# Patient Record
Sex: MALE | Race: BLACK OR AFRICAN AMERICAN | NOT HISPANIC OR LATINO | Employment: OTHER | URBAN - METROPOLITAN AREA
[De-identification: names, ages, dates, MRNs, and addresses within clinical notes are randomized per-mention and may not be internally consistent; named-entity substitution may affect disease eponyms.]

---

## 2018-08-17 ENCOUNTER — HOSPITAL ENCOUNTER (INPATIENT)
Facility: HOSPITAL | Age: 78
LOS: 4 days | Discharge: NURSING, REHAB OR LONG-TERM CARE | DRG: 299 | End: 2018-08-21
Attending: FAMILY MEDICINE | Admitting: FAMILY MEDICINE
Payer: MEDICARE

## 2018-08-17 DIAGNOSIS — I87.8 VENOUS STASIS OF BOTH LOWER EXTREMITIES: Primary | ICD-10-CM

## 2018-08-17 DIAGNOSIS — I87.2 VENOUS STASIS ULCER OF LEFT CALF LIMITED TO BREAKDOWN OF SKIN WITHOUT VARICOSE VEINS: ICD-10-CM

## 2018-08-17 DIAGNOSIS — I50.23 SYSTOLIC CHF, ACUTE ON CHRONIC: ICD-10-CM

## 2018-08-17 DIAGNOSIS — I50.9 CHF (CONGESTIVE HEART FAILURE): ICD-10-CM

## 2018-08-17 DIAGNOSIS — R60.0 BILATERAL LOWER EXTREMITY EDEMA: ICD-10-CM

## 2018-08-17 DIAGNOSIS — L97.221 VENOUS STASIS ULCER OF LEFT CALF LIMITED TO BREAKDOWN OF SKIN WITHOUT VARICOSE VEINS: ICD-10-CM

## 2018-08-17 DIAGNOSIS — I50.30 (HFPEF) HEART FAILURE WITH PRESERVED EJECTION FRACTION: ICD-10-CM

## 2018-08-17 LAB
ALBUMIN SERPL BCP-MCNC: 3.1 G/DL
ALP SERPL-CCNC: 69 U/L
ALT SERPL W/O P-5'-P-CCNC: 11 U/L
ANION GAP SERPL CALC-SCNC: 14 MMOL/L
AST SERPL-CCNC: 33 U/L
BACTERIA #/AREA URNS HPF: ABNORMAL /HPF
BASOPHILS # BLD AUTO: 0.03 K/UL
BASOPHILS NFR BLD: 0.4 %
BILIRUB SERPL-MCNC: 1.4 MG/DL
BILIRUB UR QL STRIP: NEGATIVE
BNP SERPL-MCNC: 193 PG/ML
BUN SERPL-MCNC: 10 MG/DL
CALCIUM SERPL-MCNC: 9.6 MG/DL
CHLORIDE SERPL-SCNC: 89 MMOL/L
CK SERPL-CCNC: 906 U/L
CK SERPL-CCNC: 906 U/L
CLARITY UR: CLEAR
CO2 SERPL-SCNC: 37 MMOL/L
COLOR UR: YELLOW
CREAT SERPL-MCNC: 1 MG/DL
DIFFERENTIAL METHOD: ABNORMAL
EOSINOPHIL # BLD AUTO: 0 K/UL
EOSINOPHIL NFR BLD: 0.5 %
ERYTHROCYTE [DISTWIDTH] IN BLOOD BY AUTOMATED COUNT: 14.6 %
EST. GFR  (AFRICAN AMERICAN): >60 ML/MIN/1.73 M^2
EST. GFR  (NON AFRICAN AMERICAN): >60 ML/MIN/1.73 M^2
GLUCOSE SERPL-MCNC: 146 MG/DL
GLUCOSE UR QL STRIP: NEGATIVE
HCT VFR BLD AUTO: 38.1 %
HGB BLD-MCNC: 12.8 G/DL
HGB UR QL STRIP: ABNORMAL
KETONES UR QL STRIP: NEGATIVE
LEUKOCYTE ESTERASE UR QL STRIP: ABNORMAL
LYMPHOCYTES # BLD AUTO: 1.4 K/UL
LYMPHOCYTES NFR BLD: 16.5 %
MAGNESIUM SERPL-MCNC: 1.9 MG/DL
MCH RBC QN AUTO: 28.1 PG
MCHC RBC AUTO-ENTMCNC: 33.6 G/DL
MCV RBC AUTO: 84 FL
MICROSCOPIC COMMENT: ABNORMAL
MONOCYTES # BLD AUTO: 0.7 K/UL
MONOCYTES NFR BLD: 8.4 %
NEUTROPHILS # BLD AUTO: 6.2 K/UL
NEUTROPHILS NFR BLD: 74.2 %
NITRITE UR QL STRIP: NEGATIVE
PH UR STRIP: 7 [PH] (ref 5–8)
PLATELET # BLD AUTO: 272 K/UL
PMV BLD AUTO: 8.3 FL
POTASSIUM SERPL-SCNC: 2.5 MMOL/L
PROT SERPL-MCNC: 8 G/DL
PROT UR QL STRIP: NEGATIVE
RBC # BLD AUTO: 4.56 M/UL
RBC #/AREA URNS HPF: 5 /HPF (ref 0–4)
SODIUM SERPL-SCNC: 140 MMOL/L
SP GR UR STRIP: 1.01 (ref 1–1.03)
TROPONIN I SERPL DL<=0.01 NG/ML-MCNC: 0.02 NG/ML
URN SPEC COLLECT METH UR: ABNORMAL
UROBILINOGEN UR STRIP-ACNC: >=8 EU/DL
WBC # BLD AUTO: 8.3 K/UL
WBC #/AREA URNS HPF: 5 /HPF (ref 0–5)

## 2018-08-17 PROCEDURE — 99285 EMERGENCY DEPT VISIT HI MDM: CPT | Mod: 25

## 2018-08-17 PROCEDURE — 96365 THER/PROPH/DIAG IV INF INIT: CPT

## 2018-08-17 PROCEDURE — 63600175 PHARM REV CODE 636 W HCPCS: Performed by: FAMILY MEDICINE

## 2018-08-17 PROCEDURE — 93010 ELECTROCARDIOGRAM REPORT: CPT | Mod: ,,, | Performed by: INTERNAL MEDICINE

## 2018-08-17 PROCEDURE — 85025 COMPLETE CBC W/AUTO DIFF WBC: CPT

## 2018-08-17 PROCEDURE — 96375 TX/PRO/DX INJ NEW DRUG ADDON: CPT

## 2018-08-17 PROCEDURE — 80053 COMPREHEN METABOLIC PANEL: CPT

## 2018-08-17 PROCEDURE — 93005 ELECTROCARDIOGRAM TRACING: CPT

## 2018-08-17 PROCEDURE — 25000003 PHARM REV CODE 250: Performed by: FAMILY MEDICINE

## 2018-08-17 PROCEDURE — 81000 URINALYSIS NONAUTO W/SCOPE: CPT

## 2018-08-17 PROCEDURE — 83735 ASSAY OF MAGNESIUM: CPT

## 2018-08-17 PROCEDURE — 83880 ASSAY OF NATRIURETIC PEPTIDE: CPT

## 2018-08-17 PROCEDURE — 82550 ASSAY OF CK (CPK): CPT

## 2018-08-17 PROCEDURE — 21400001 HC TELEMETRY ROOM

## 2018-08-17 PROCEDURE — 84484 ASSAY OF TROPONIN QUANT: CPT

## 2018-08-17 RX ORDER — POTASSIUM CHLORIDE 20 MEQ/15ML
20 SOLUTION ORAL
Status: COMPLETED | OUTPATIENT
Start: 2018-08-17 | End: 2018-08-17

## 2018-08-17 RX ORDER — KETOROLAC TROMETHAMINE 30 MG/ML
30 INJECTION, SOLUTION INTRAMUSCULAR; INTRAVENOUS
Status: COMPLETED | OUTPATIENT
Start: 2018-08-17 | End: 2018-08-17

## 2018-08-17 RX ORDER — FUROSEMIDE 10 MG/ML
40 INJECTION INTRAMUSCULAR; INTRAVENOUS
Status: COMPLETED | OUTPATIENT
Start: 2018-08-17 | End: 2018-08-17

## 2018-08-17 RX ORDER — POTASSIUM CHLORIDE 20 MEQ/15ML
20 SOLUTION ORAL ONCE
Status: COMPLETED | OUTPATIENT
Start: 2018-08-18 | End: 2018-08-18

## 2018-08-17 RX ADMIN — POTASSIUM CHLORIDE 20 MEQ: 20 SOLUTION ORAL at 11:08

## 2018-08-17 RX ADMIN — FUROSEMIDE 40 MG: 10 INJECTION, SOLUTION INTRAMUSCULAR; INTRAVENOUS at 11:08

## 2018-08-17 RX ADMIN — CEFTRIAXONE SODIUM 2 G: 2 INJECTION, POWDER, FOR SOLUTION INTRAMUSCULAR; INTRAVENOUS at 11:08

## 2018-08-17 RX ADMIN — KETOROLAC TROMETHAMINE 30 MG: 30 INJECTION, SOLUTION INTRAMUSCULAR at 11:08

## 2018-08-18 PROBLEM — L97.211 VENOUS STASIS ULCER OF RIGHT CALF LIMITED TO BREAKDOWN OF SKIN WITHOUT VARICOSE VEINS: Status: ACTIVE | Noted: 2018-08-18

## 2018-08-18 PROBLEM — I87.2 VENOUS STASIS ULCER OF RIGHT CALF LIMITED TO BREAKDOWN OF SKIN WITHOUT VARICOSE VEINS: Status: ACTIVE | Noted: 2018-08-18

## 2018-08-18 PROBLEM — E87.6 HYPOKALEMIA: Status: ACTIVE | Noted: 2018-08-18

## 2018-08-18 PROBLEM — I87.8 VENOUS STASIS OF BOTH LOWER EXTREMITIES: Status: ACTIVE | Noted: 2018-08-18

## 2018-08-18 PROBLEM — M62.82 NON-TRAUMATIC RHABDOMYOLYSIS: Status: ACTIVE | Noted: 2018-08-18

## 2018-08-18 LAB
ANION GAP SERPL CALC-SCNC: 11 MMOL/L
ANION GAP SERPL CALC-SCNC: 11 MMOL/L
BASOPHILS # BLD AUTO: 0.04 K/UL
BASOPHILS NFR BLD: 0.5 %
BUN SERPL-MCNC: 10 MG/DL
BUN SERPL-MCNC: 10 MG/DL
CALCIUM SERPL-MCNC: 9.2 MG/DL
CALCIUM SERPL-MCNC: 9.4 MG/DL
CHLORIDE SERPL-SCNC: 90 MMOL/L
CHLORIDE SERPL-SCNC: 91 MMOL/L
CO2 SERPL-SCNC: 38 MMOL/L
CO2 SERPL-SCNC: 38 MMOL/L
CREAT SERPL-MCNC: 0.9 MG/DL
CREAT SERPL-MCNC: 1 MG/DL
DIFFERENTIAL METHOD: ABNORMAL
EOSINOPHIL # BLD AUTO: 0 K/UL
EOSINOPHIL NFR BLD: 0.3 %
ERYTHROCYTE [DISTWIDTH] IN BLOOD BY AUTOMATED COUNT: 14.7 %
EST. GFR  (AFRICAN AMERICAN): >60 ML/MIN/1.73 M^2
EST. GFR  (AFRICAN AMERICAN): >60 ML/MIN/1.73 M^2
EST. GFR  (NON AFRICAN AMERICAN): >60 ML/MIN/1.73 M^2
EST. GFR  (NON AFRICAN AMERICAN): >60 ML/MIN/1.73 M^2
ESTIMATED AVG GLUCOSE: 123 MG/DL
GLUCOSE SERPL-MCNC: 124 MG/DL
GLUCOSE SERPL-MCNC: 126 MG/DL
HBA1C MFR BLD HPLC: 5.9 %
HCT VFR BLD AUTO: 38.8 %
HGB BLD-MCNC: 12.8 G/DL
INR PPP: 1.9
LYMPHOCYTES # BLD AUTO: 1.1 K/UL
LYMPHOCYTES NFR BLD: 14.1 %
MAGNESIUM SERPL-MCNC: 1.8 MG/DL
MCH RBC QN AUTO: 27.8 PG
MCHC RBC AUTO-ENTMCNC: 33 G/DL
MCV RBC AUTO: 84 FL
MONOCYTES # BLD AUTO: 0.9 K/UL
MONOCYTES NFR BLD: 11.8 %
NEUTROPHILS # BLD AUTO: 5.7 K/UL
NEUTROPHILS NFR BLD: 73.3 %
PHOSPHATE SERPL-MCNC: 2.5 MG/DL
PLATELET # BLD AUTO: 266 K/UL
PMV BLD AUTO: 8.5 FL
POCT GLUCOSE: 112 MG/DL (ref 70–110)
POCT GLUCOSE: 122 MG/DL (ref 70–110)
POCT GLUCOSE: 135 MG/DL (ref 70–110)
POTASSIUM SERPL-SCNC: 2.6 MMOL/L
POTASSIUM SERPL-SCNC: 3 MMOL/L
PROTHROMBIN TIME: 19.5 SEC
RBC # BLD AUTO: 4.61 M/UL
SODIUM SERPL-SCNC: 139 MMOL/L
SODIUM SERPL-SCNC: 140 MMOL/L
URATE SERPL-MCNC: 7.1 MG/DL
WBC # BLD AUTO: 7.79 K/UL

## 2018-08-18 PROCEDURE — 84550 ASSAY OF BLOOD/URIC ACID: CPT

## 2018-08-18 PROCEDURE — C8929 TTE W OR WO FOL WCON,DOPPLER: HCPCS

## 2018-08-18 PROCEDURE — 83735 ASSAY OF MAGNESIUM: CPT

## 2018-08-18 PROCEDURE — 63600175 PHARM REV CODE 636 W HCPCS: Performed by: FAMILY MEDICINE

## 2018-08-18 PROCEDURE — 63600175 PHARM REV CODE 636 W HCPCS: Performed by: INTERNAL MEDICINE

## 2018-08-18 PROCEDURE — 25000003 PHARM REV CODE 250: Performed by: INTERNAL MEDICINE

## 2018-08-18 PROCEDURE — 99900037 HC PT THERAPY SCREENING (STAT)

## 2018-08-18 PROCEDURE — 83036 HEMOGLOBIN GLYCOSYLATED A1C: CPT

## 2018-08-18 PROCEDURE — 90715 TDAP VACCINE 7 YRS/> IM: CPT | Performed by: INTERNAL MEDICINE

## 2018-08-18 PROCEDURE — 63600175 PHARM REV CODE 636 W HCPCS: Mod: JG | Performed by: INTERNAL MEDICINE

## 2018-08-18 PROCEDURE — 90471 IMMUNIZATION ADMIN: CPT | Performed by: INTERNAL MEDICINE

## 2018-08-18 PROCEDURE — 21400001 HC TELEMETRY ROOM

## 2018-08-18 PROCEDURE — 85025 COMPLETE CBC W/AUTO DIFF WBC: CPT

## 2018-08-18 PROCEDURE — 93306 TTE W/DOPPLER COMPLETE: CPT | Mod: 26,,, | Performed by: INTERNAL MEDICINE

## 2018-08-18 PROCEDURE — 80048 BASIC METABOLIC PNL TOTAL CA: CPT

## 2018-08-18 PROCEDURE — 84100 ASSAY OF PHOSPHORUS: CPT

## 2018-08-18 PROCEDURE — G0378 HOSPITAL OBSERVATION PER HR: HCPCS

## 2018-08-18 PROCEDURE — 96376 TX/PRO/DX INJ SAME DRUG ADON: CPT

## 2018-08-18 PROCEDURE — 96366 THER/PROPH/DIAG IV INF ADDON: CPT

## 2018-08-18 PROCEDURE — 25000003 PHARM REV CODE 250: Performed by: FAMILY MEDICINE

## 2018-08-18 PROCEDURE — 96367 TX/PROPH/DG ADDL SEQ IV INF: CPT

## 2018-08-18 PROCEDURE — 85610 PROTHROMBIN TIME: CPT

## 2018-08-18 PROCEDURE — 80048 BASIC METABOLIC PNL TOTAL CA: CPT | Mod: 91

## 2018-08-18 RX ORDER — ENOXAPARIN SODIUM 100 MG/ML
40 INJECTION SUBCUTANEOUS EVERY 24 HOURS
Status: DISCONTINUED | OUTPATIENT
Start: 2018-08-18 | End: 2018-08-21 | Stop reason: HOSPADM

## 2018-08-18 RX ORDER — FUROSEMIDE 10 MG/ML
40 INJECTION INTRAMUSCULAR; INTRAVENOUS
Status: COMPLETED | OUTPATIENT
Start: 2018-08-18 | End: 2018-08-18

## 2018-08-18 RX ORDER — POTASSIUM CHLORIDE 20 MEQ/15ML
40 SOLUTION ORAL
Status: DISCONTINUED | OUTPATIENT
Start: 2018-08-18 | End: 2018-08-20

## 2018-08-18 RX ORDER — SODIUM,POTASSIUM PHOSPHATES 280-250MG
2 POWDER IN PACKET (EA) ORAL
Status: DISCONTINUED | OUTPATIENT
Start: 2018-08-18 | End: 2018-08-21 | Stop reason: HOSPADM

## 2018-08-18 RX ORDER — SODIUM CHLORIDE 0.9 % (FLUSH) 0.9 %
5 SYRINGE (ML) INJECTION
Status: DISCONTINUED | OUTPATIENT
Start: 2018-08-18 | End: 2018-08-21 | Stop reason: HOSPADM

## 2018-08-18 RX ORDER — GLUCAGON 1 MG
1 KIT INJECTION
Status: DISCONTINUED | OUTPATIENT
Start: 2018-08-18 | End: 2018-08-21 | Stop reason: HOSPADM

## 2018-08-18 RX ORDER — POTASSIUM CHLORIDE 20 MEQ/15ML
40 SOLUTION ORAL ONCE
Status: DISCONTINUED | OUTPATIENT
Start: 2018-08-18 | End: 2018-08-18

## 2018-08-18 RX ORDER — TRAMADOL HYDROCHLORIDE 50 MG/1
50 TABLET ORAL EVERY 8 HOURS PRN
COMMUNITY

## 2018-08-18 RX ORDER — LANOLIN ALCOHOL/MO/W.PET/CERES
800 CREAM (GRAM) TOPICAL
Status: DISCONTINUED | OUTPATIENT
Start: 2018-08-18 | End: 2018-08-21 | Stop reason: HOSPADM

## 2018-08-18 RX ORDER — ATORVASTATIN CALCIUM 10 MG/1
10 TABLET, FILM COATED ORAL DAILY
COMMUNITY

## 2018-08-18 RX ORDER — POTASSIUM CHLORIDE 7.45 MG/ML
20 INJECTION INTRAVENOUS
Status: COMPLETED | OUTPATIENT
Start: 2018-08-18 | End: 2018-08-18

## 2018-08-18 RX ORDER — WARFARIN SODIUM 5 MG/1
5 TABLET ORAL DAILY
COMMUNITY

## 2018-08-18 RX ORDER — IBUPROFEN 200 MG
24 TABLET ORAL
Status: DISCONTINUED | OUTPATIENT
Start: 2018-08-18 | End: 2018-08-21 | Stop reason: HOSPADM

## 2018-08-18 RX ORDER — IBUPROFEN 200 MG
16 TABLET ORAL
Status: DISCONTINUED | OUTPATIENT
Start: 2018-08-18 | End: 2018-08-21 | Stop reason: HOSPADM

## 2018-08-18 RX ORDER — POLYETHYLENE GLYCOL 3350 17 G/17G
17 POWDER, FOR SOLUTION ORAL DAILY
Status: DISCONTINUED | OUTPATIENT
Start: 2018-08-18 | End: 2018-08-21 | Stop reason: HOSPADM

## 2018-08-18 RX ORDER — POTASSIUM CHLORIDE 20 MEQ/1
40 TABLET, EXTENDED RELEASE ORAL ONCE
Status: DISCONTINUED | OUTPATIENT
Start: 2018-08-18 | End: 2018-08-21 | Stop reason: ALTCHOICE

## 2018-08-18 RX ORDER — ACETAMINOPHEN 325 MG/1
650 TABLET ORAL EVERY 6 HOURS PRN
Status: DISCONTINUED | OUTPATIENT
Start: 2018-08-18 | End: 2018-08-21 | Stop reason: HOSPADM

## 2018-08-18 RX ORDER — FUROSEMIDE 10 MG/ML
40 INJECTION INTRAMUSCULAR; INTRAVENOUS DAILY
Status: DISCONTINUED | OUTPATIENT
Start: 2018-08-18 | End: 2018-08-20

## 2018-08-18 RX ORDER — LISINOPRIL 5 MG/1
5 TABLET ORAL DAILY
COMMUNITY

## 2018-08-18 RX ORDER — KETOROLAC TROMETHAMINE 30 MG/ML
15 INJECTION, SOLUTION INTRAMUSCULAR; INTRAVENOUS EVERY 6 HOURS PRN
Status: ACTIVE | OUTPATIENT
Start: 2018-08-18 | End: 2018-08-18

## 2018-08-18 RX ORDER — INSULIN ASPART 100 [IU]/ML
0-5 INJECTION, SOLUTION INTRAVENOUS; SUBCUTANEOUS
Status: DISCONTINUED | OUTPATIENT
Start: 2018-08-18 | End: 2018-08-21 | Stop reason: HOSPADM

## 2018-08-18 RX ORDER — POTASSIUM CHLORIDE 20 MEQ/15ML
40 SOLUTION ORAL
Status: COMPLETED | OUTPATIENT
Start: 2018-08-18 | End: 2018-08-18

## 2018-08-18 RX ORDER — KETOROLAC TROMETHAMINE 30 MG/ML
30 INJECTION, SOLUTION INTRAMUSCULAR; INTRAVENOUS
Status: COMPLETED | OUTPATIENT
Start: 2018-08-18 | End: 2018-08-18

## 2018-08-18 RX ORDER — DIGOXIN 250 MCG
250 TABLET ORAL DAILY
COMMUNITY

## 2018-08-18 RX ORDER — IBUPROFEN 400 MG/1
400 TABLET ORAL EVERY 6 HOURS PRN
Status: DISCONTINUED | OUTPATIENT
Start: 2018-08-18 | End: 2018-08-20

## 2018-08-18 RX ORDER — SULFAMETHOXAZOLE AND TRIMETHOPRIM 400; 80 MG/1; MG/1
1 TABLET ORAL 2 TIMES DAILY
Status: ON HOLD | COMMUNITY
End: 2018-08-21 | Stop reason: HOSPADM

## 2018-08-18 RX ORDER — FUROSEMIDE 40 MG/1
40 TABLET ORAL 2 TIMES DAILY
COMMUNITY

## 2018-08-18 RX ORDER — HYDROCODONE BITARTRATE AND ACETAMINOPHEN 10; 325 MG/1; MG/1
1 TABLET ORAL EVERY 6 HOURS PRN
Status: DISCONTINUED | OUTPATIENT
Start: 2018-08-18 | End: 2018-08-21 | Stop reason: HOSPADM

## 2018-08-18 RX ORDER — ONDANSETRON 8 MG/1
8 TABLET, ORALLY DISINTEGRATING ORAL EVERY 8 HOURS PRN
Status: DISCONTINUED | OUTPATIENT
Start: 2018-08-18 | End: 2018-08-21 | Stop reason: HOSPADM

## 2018-08-18 RX ADMIN — CEFTAROLINE FOSAMIL 600 MG: 600 POWDER, FOR SOLUTION INTRAVENOUS at 10:08

## 2018-08-18 RX ADMIN — ENOXAPARIN SODIUM 40 MG: 100 INJECTION SUBCUTANEOUS at 04:08

## 2018-08-18 RX ADMIN — POTASSIUM CHLORIDE 20 MEQ: 7.46 INJECTION, SOLUTION INTRAVENOUS at 03:08

## 2018-08-18 RX ADMIN — POLYETHYLENE GLYCOL 3350 17 G: 17 POWDER, FOR SOLUTION ORAL at 09:08

## 2018-08-18 RX ADMIN — POTASSIUM CHLORIDE 40 MEQ: 20 SOLUTION ORAL at 03:08

## 2018-08-18 RX ADMIN — POTASSIUM & SODIUM PHOSPHATES POWDER PACK 280-160-250 MG 2 PACKET: 280-160-250 PACK at 08:08

## 2018-08-18 RX ADMIN — FUROSEMIDE 40 MG: 10 INJECTION, SOLUTION INTRAMUSCULAR; INTRAVENOUS at 03:08

## 2018-08-18 RX ADMIN — POTASSIUM CHLORIDE 40 MEQ: 20 SOLUTION ORAL at 07:08

## 2018-08-18 RX ADMIN — HYDROCODONE BITARTRATE AND ACETAMINOPHEN 1 TABLET: 10; 325 TABLET ORAL at 08:08

## 2018-08-18 RX ADMIN — POTASSIUM CHLORIDE 20 MEQ: 20 SOLUTION ORAL at 01:08

## 2018-08-18 RX ADMIN — BACITRACIN, NEOMYCIN, POLYMYXIN B: 400; 3.5; 5 OINTMENT TOPICAL at 09:08

## 2018-08-18 RX ADMIN — CLOSTRIDIUM TETANI TOXOID ANTIGEN (FORMALDEHYDE INACTIVATED), CORYNEBACTERIUM DIPHTHERIAE TOXOID ANTIGEN (FORMALDEHYDE INACTIVATED), BORDETELLA PERTUSSIS TOXOID ANTIGEN (GLUTARALDEHYDE INACTIVATED), BORDETELLA PERTUSSIS FILAMENTOUS HEMAGGLUTININ ANTIGEN (FORMALDEHYDE INACTIVATED), BORDETELLA PERTUSSIS PERTACTIN ANTIGEN, AND BORDETELLA PERTUSSIS FIMBRIAE 2/3 ANTIGEN 0.5 ML: 5; 2; 2.5; 5; 3; 5 INJECTION, SUSPENSION INTRAMUSCULAR at 03:08

## 2018-08-18 RX ADMIN — FUROSEMIDE 40 MG: 10 INJECTION, SOLUTION INTRAMUSCULAR; INTRAVENOUS at 12:08

## 2018-08-18 RX ADMIN — BACITRACIN, NEOMYCIN, POLYMYXIN B: 400; 3.5; 5 OINTMENT TOPICAL at 08:08

## 2018-08-18 RX ADMIN — KETOROLAC TROMETHAMINE 30 MG: 30 INJECTION, SOLUTION INTRAMUSCULAR at 03:08

## 2018-08-18 NOTE — PLAN OF CARE
Problem: Patient Care Overview  Goal: Plan of Care Review  Outcome: Ongoing (interventions implemented as appropriate)  Patient remains free from falls or injury this shift, safety measures in place. Medications administered per order, patient tolerating treatment. VS stable, normal sinus rhythm on the telemetry monitor. Bed alarm in use. Call light and belongings within reach. Denies any other needs or complaints. Will continue to monitor.

## 2018-08-18 NOTE — PT/OT/SLP PROGRESS
Physical Therapy      Patient Name:  Jessee Katz   MRN:  [id number]    PT CHART REVIEW COMPLETED. PT WITH 10/10 LEG PAIN AND REQUEST NO EVAL AT THIS TIME. P.T. EDUCATED PT ON ROLE OF P.T. AND TO RETURN NEXT VISIT TO COMPLETE EVAL.     Yuridia Vega, PT,8/18/2018

## 2018-08-18 NOTE — ED PROVIDER NOTES
SCRIBE #1 NOTE: I, Priya Briscoe, am scribing for, and in the presence of, Kalyn Yousif MD. I have scribed the entire note.      History      Chief Complaint   Patient presents with    Leg Swelling     bilateral leg swelling for several weeks       Review of patient's allergies indicates:  No Known Allergies     HPI   HPI    8/17/2018, 10:22 PM   History obtained from the patient      History of Present Illness: Jessee Katz is a 78 y.o. male patient who presents to the Emergency Department for BLE swelling which onset gradually a few weeks ago. Symptoms are constant and moderate in severity. No mitigating or exacerbating factors reported. No associated sxs included. Patient denies any fever, chills, CP, SOB, abd distention, scrotal swelling, dysuria, N/V, recent travel, long car rides, and all other sxs at this time. Patient reports living alone and caring for himself. No further complaints or concerns at this time.     Arrival mode: Personal vehicle     PCP: Олег Ribeiro MD     Past Medical History:  Past medical history reviewed not relevant      Past Surgical History:  Past surgical history reviewed not relevant      Family History:  Family history reviewed not relevant      Social History:  Social History    Social History Main Topics    Social History Main Topics    Smoking status: Unknown if ever smoked    Smokeless tobacco: Unknown if ever used    Alcohol Use: Unknown drinking history    Drug Use: Unknown if ever used    Sexual Activity: Unknown       ROS   Review of Systems   Constitutional: Negative for chills and fever.   HENT: Negative for sore throat.    Respiratory: Negative for shortness of breath.    Cardiovascular: Positive for leg swelling (BLE). Negative for chest pain.   Gastrointestinal: Negative for abdominal distention, nausea and vomiting.   Genitourinary: Negative for dysuria and scrotal swelling.   Musculoskeletal: Negative for back pain.   Skin: Negative for rash.    Neurological: Negative for weakness.   Hematological: Does not bruise/bleed easily.   All other systems reviewed and are negative.      Physical Exam      Initial Vitals [08/17/18 2108]   BP Pulse Resp Temp SpO2   (!) 152/74 100 18 99.4 °F (37.4 °C) 95 %      MAP       --          Physical Exam  Nursing Notes and Vital Signs Reviewed.  Constitutional: Patient is in no acute distress. Well-developed and well-nourished.  Head: Atraumatic. Normocephalic.  Eyes: PERRL. EOM intact. Conjunctivae are not pale. No scleral icterus.  ENT: Mucous membranes are moist. Oropharynx is clear and symmetric.    Neck: Supple. Full ROM. No lymphadenopathy.  Cardiovascular: Regular rate. Irregularly irregular rhythm. No murmurs, rubs, or gallops. Distal pulses are 2+ and symmetric.  Pulmonary/Chest: No respiratory distress. Clear to auscultation bilaterally. No wheezing or rales.  Abdominal: Soft and non-distended.  There is no tenderness.  No rebound, guarding, or rigidity. Good bowel sounds.  Genitourinary: No CVA tenderness  Musculoskeletal: Moves all extremities. No obvious deformities. No calf tenderness.  RLE, LLE: Extensive diffuse piting edema to BLE extending to knees. Venous stasis changes to BLE. 2cm round ulcer to L lateral ankle, no drainage, dry, no palpable fluctuance. Unable to range bilat knees and ankles secondary to pain. LLE sensitive to touch. Cap refill distally is <2 seconds. DP and PT pulses are equal and 2+ bilaterally.  Skin: Warm and dry.  Neurological:  Alert, awake, and appropriate.  Normal speech.  No acute focal neurological deficits are appreciated.  Psychiatric: Normal affect. Good eye contact. Appropriate in content.    ED Course    Procedures  ED Vital Signs:  Vitals:    08/17/18 2108 08/17/18 2317 08/17/18 2332 08/18/18 0136   BP: (!) 152/74 135/81  (!) 152/82   Pulse: 100 92 103 105   Resp: 18 17  (!) 31   Temp: 99.4 °F (37.4 °C)      TempSrc: Oral      SpO2: 95% 99%  99%   Weight: 126.1 kg (278  "lb)      Height: 5' 11" (1.803 m)          Abnormal Lab Results:  Labs Reviewed   CBC W/ AUTO DIFFERENTIAL - Abnormal; Notable for the following components:       Result Value    RBC 4.56 (*)     Hemoglobin 12.8 (*)     Hematocrit 38.1 (*)     RDW 14.6 (*)     MPV 8.3 (*)     Gran% 74.2 (*)     Lymph% 16.5 (*)     All other components within normal limits   COMPREHENSIVE METABOLIC PANEL - Abnormal; Notable for the following components:    Potassium 2.5 (*)     Chloride 89 (*)     CO2 37 (*)     Glucose 146 (*)     Albumin 3.1 (*)     Total Bilirubin 1.4 (*)     All other components within normal limits    Narrative:     K critical result(s) called and verbal readback obtained from   Suzanne Bae RN, 08/17/2018 23:30   CK - Abnormal; Notable for the following components:     (*)     All other components within normal limits   CK - Abnormal; Notable for the following components:     (*)     All other components within normal limits   B-TYPE NATRIURETIC PEPTIDE - Abnormal; Notable for the following components:     (*)     All other components within normal limits   URINALYSIS - Abnormal; Notable for the following components:    Occult Blood UA 3+ (*)     Urobilinogen, UA >=8.0 (*)     Leukocytes, UA 1+ (*)     All other components within normal limits   URINALYSIS MICROSCOPIC - Abnormal; Notable for the following components:    RBC, UA 5 (*)     Bacteria, UA Many (*)     All other components within normal limits   BASIC METABOLIC PANEL - Abnormal; Notable for the following components:    Potassium 2.6 (*)     Chloride 90 (*)     CO2 38 (*)     Glucose 126 (*)     All other components within normal limits    Narrative:     K critical result(s) called and verbal readback obtained from Elodia Murray RN, 08/18/2018 02:46   MAGNESIUM   TROPONIN I   BASIC METABOLIC PANEL        All Lab Results:  Results for orders placed or performed during the hospital encounter of 08/17/18   CBC auto differential "   Result Value Ref Range    WBC 8.30 3.90 - 12.70 K/uL    RBC 4.56 (L) 4.60 - 6.20 M/uL    Hemoglobin 12.8 (L) 14.0 - 18.0 g/dL    Hematocrit 38.1 (L) 40.0 - 54.0 %    MCV 84 82 - 98 fL    MCH 28.1 27.0 - 31.0 pg    MCHC 33.6 32.0 - 36.0 g/dL    RDW 14.6 (H) 11.5 - 14.5 %    Platelets 272 150 - 350 K/uL    MPV 8.3 (L) 9.2 - 12.9 fL    Gran # (ANC) 6.2 1.8 - 7.7 K/uL    Lymph # 1.4 1.0 - 4.8 K/uL    Mono # 0.7 0.3 - 1.0 K/uL    Eos # 0.0 0.0 - 0.5 K/uL    Baso # 0.03 0.00 - 0.20 K/uL    Gran% 74.2 (H) 38.0 - 73.0 %    Lymph% 16.5 (L) 18.0 - 48.0 %    Mono% 8.4 4.0 - 15.0 %    Eosinophil% 0.5 0.0 - 8.0 %    Basophil% 0.4 0.0 - 1.9 %    Differential Method Automated    Comprehensive metabolic panel   Result Value Ref Range    Sodium 140 136 - 145 mmol/L    Potassium 2.5 (LL) 3.5 - 5.1 mmol/L    Chloride 89 (L) 95 - 110 mmol/L    CO2 37 (H) 23 - 29 mmol/L    Glucose 146 (H) 70 - 110 mg/dL    BUN, Bld 10 8 - 23 mg/dL    Creatinine 1.0 0.5 - 1.4 mg/dL    Calcium 9.6 8.7 - 10.5 mg/dL    Total Protein 8.0 6.0 - 8.4 g/dL    Albumin 3.1 (L) 3.5 - 5.2 g/dL    Total Bilirubin 1.4 (H) 0.1 - 1.0 mg/dL    Alkaline Phosphatase 69 55 - 135 U/L    AST 33 10 - 40 U/L    ALT 11 10 - 44 U/L    Anion Gap 14 8 - 16 mmol/L    eGFR if African American >60 >60 mL/min/1.73 m^2    eGFR if non African American >60 >60 mL/min/1.73 m^2   Magnesium   Result Value Ref Range    Magnesium 1.9 1.6 - 2.6 mg/dL   Troponin I   Result Value Ref Range    Troponin I 0.016 0.000 - 0.026 ng/mL   CPK   Result Value Ref Range     (H) 20 - 200 U/L   CPK   Result Value Ref Range     (H) 20 - 200 U/L   Brain natriuretic peptide   Result Value Ref Range     (H) 0 - 99 pg/mL   Urinalysis   Result Value Ref Range    Specimen UA Urine, Clean Catch     Color, UA Yellow Yellow, Straw, Debora    Appearance, UA Clear Clear    pH, UA 7.0 5.0 - 8.0    Specific Gravity, UA 1.010 1.005 - 1.030    Protein, UA Negative Negative    Glucose, UA Negative  Negative    Ketones, UA Negative Negative    Bilirubin (UA) Negative Negative    Occult Blood UA 3+ (A) Negative    Nitrite, UA Negative Negative    Urobilinogen, UA >=8.0 (A) <2.0 EU/dL    Leukocytes, UA 1+ (A) Negative   Urinalysis Microscopic   Result Value Ref Range    RBC, UA 5 (H) 0 - 4 /hpf    WBC, UA 5 0 - 5 /hpf    Bacteria, UA Many (A) None-Occ /hpf    Microscopic Comment SEE COMMENT    Basic metabolic panel   Result Value Ref Range    Sodium 139 136 - 145 mmol/L    Potassium 2.6 (LL) 3.5 - 5.1 mmol/L    Chloride 90 (L) 95 - 110 mmol/L    CO2 38 (H) 23 - 29 mmol/L    Glucose 126 (H) 70 - 110 mg/dL    BUN, Bld 10 8 - 23 mg/dL    Creatinine 0.9 0.5 - 1.4 mg/dL    Calcium 9.2 8.7 - 10.5 mg/dL    Anion Gap 11 8 - 16 mmol/L    eGFR if African American >60 >60 mL/min/1.73 m^2    eGFR if non African American >60 >60 mL/min/1.73 m^2       Imaging Results:  Imaging Results          X-Ray Chest AP Portable (Final result)  Result time 08/17/18 22:59:28    Final result by Ventura Turcios MD (08/17/18 22:59:28)                 Impression:      No acute process seen.      Electronically signed by: Ventura Turcios MD  Date:    08/17/2018  Time:    22:59             Narrative:    EXAMINATION:  XR CHEST AP PORTABLE    CLINICAL HISTORY:  Chest Pain;    FINDINGS:  Single view of the chest.  Aorta demonstrates atherosclerotic disease.    Cardiac silhouette is normal.  The lungs demonstrate no evidence of active disease.  No evidence of pleural effusion or pneumothorax.  Bones demonstrate scattered degenerative changes.                               The EKG was ordered, reviewed, and independently interpreted by the ED provider.  Interpretation time: 2225  Rate: 105 BPM  Rhythm: atrial fibrillation with rapid ventricular response with premature ventricular or aberrantly conducted complexes  Interpretation: ST & T wave abnormality, consider anterolateral ischemia. Abnormal ECG. No STEMI.         The Emergency Provider reviewed  the vital signs and test results, which are outlined above.    ED Discussion     2:51 AM: Discussed case with Maryann English NP (Hospital Medicine) who agrees with current care and management of pt and accepts admission.   Admitting Service: Hospital medicine   Admitting Physician: Dr. Jonse  Admit to: Med-tele    2:58 AM: Re-evaluated pt. I have discussed test results, shared treatment plan, and the need for admission with patient and family at bedside. Pt and family express understanding at this time and agree with all information. All questions answered. Pt and family have no further questions or concerns at this time. Pt is ready for admit.      ED Medication(s):  Medications   potassium chloride 10 mEq in 100 mL IVPB (not administered)   furosemide injection 40 mg (40 mg Intravenous Given 8/17/18 2312)   ketorolac injection 30 mg (30 mg Intravenous Given 8/17/18 2312)   cefTRIAXone (ROCEPHIN) 2 g in dextrose 5 % 50 mL IVPB (0 g Intravenous Stopped 8/18/18 0012)   potassium chloride 10% oral solution 20 mEq (20 mEq Oral Given 8/18/18 0104)   potassium chloride 10% oral solution 20 mEq (20 mEq Oral Given 8/17/18 2338)       New Prescriptions    No medications on file             Medical Decision Making    Medical Decision Making:   Clinical Tests:   Lab Tests: Ordered and Reviewed  Radiological Study: Ordered and Reviewed  Medical Tests: Ordered and Reviewed           Scribe Attestation:   Scribe #1: I performed the above scribed service and the documentation accurately describes the services I performed. I attest to the accuracy of the note.    Attending:   Physician Attestation Statement for Scribe #1: I, Kalyn Yousif MD, personally performed the services described in this documentation, as scribed by Priya Briscoe, in my presence, and it is both accurate and complete.          Clinical Impression       ICD-10-CM ICD-9-CM   1. Venous stasis of both lower extremities I87.8 459.81   2. Bilateral lower extremity  edema R60.0 782.3       Disposition:   Disposition: Admitted (Med-tele)  Condition: Fair         Kalyn Yousif MD  08/18/18 1958

## 2018-08-18 NOTE — ED NOTES
Pt resting in ER stretcher, aaox4, rr e/u, NAD noted. Pt remains on cardiac monitor with vss noted. Bed low and locked, call light in reach, side rails up x2. Pt verbalized understanding of status and POC; denies further needs. Will continue to monitor.

## 2018-08-18 NOTE — H&P
[facility] Hospital Medicine  History & Physical    Patient Name: Jessee Katz  MRN: [id number]  Admission Date: 8/17/2018  Attending Physician: Joann Jones MD  Primary Care Provider: Олег Ribeiro MD         Patient information was obtained from patient and ER records.     Subjective:     Principal Problem:<principal problem not specified>    Chief Complaint:   Chief Complaint   Patient presents with    Leg Swelling     bilateral leg swelling for several weeks        HPI: The patient is a 78-year old man presenting with bilateral leg swelling and ulceration for about a month. He states he sees both his cardiologist as well as PCP and started having bilateral leg swellings about 8 months ago. He soaked it in bleach hoping it would go away. He had blood work done n the E indicating early rhabdomyolysis as well as sever hypokalemia.     No past medical history on file.    No past surgical history on file.    Review of patient's allergies indicates:  No Known Allergies    No current facility-administered medications on file prior to encounter.      No current outpatient medications on file prior to encounter.     Family History     None        Tobacco Use    Smoking status: Not on file   Substance and Sexual Activity    Alcohol use: Not on file    Drug use: Not on file    Sexual activity: Not on file     Review of Systems   Constitutional: Negative for activity change, appetite change, chills, diaphoresis, fever and unexpected weight change.   HENT: Negative for congestion, drooling, ear discharge, ear pain, postnasal drip, trouble swallowing and voice change.    Eyes: Negative for photophobia, pain, discharge, redness, itching and visual disturbance.   Respiratory: Negative for apnea, choking, chest tightness, shortness of breath and stridor.    Cardiovascular: Positive for leg swelling. Negative for chest pain and palpitations.   Gastrointestinal: Negative for abdominal distention,  abdominal pain, anal bleeding, blood in stool, nausea and vomiting.   Genitourinary: Negative for difficulty urinating, dysuria, flank pain, frequency, hematuria, penile swelling, scrotal swelling and urgency.   Musculoskeletal: Positive for arthralgias, gait problem and joint swelling. Negative for myalgias, neck pain and neck stiffness.   Skin: Positive for color change and wound.   Allergic/Immunologic: Negative for environmental allergies, food allergies and immunocompromised state.   Neurological: Negative for dizziness, seizures, syncope, facial asymmetry, speech difficulty, numbness and headaches.   Hematological: Negative for adenopathy. Does not bruise/bleed easily.   Psychiatric/Behavioral: Negative for agitation, behavioral problems, confusion, decreased concentration, dysphoric mood, hallucinations and self-injury. The patient is not nervous/anxious and is not hyperactive.    All other systems reviewed and are negative.    Objective:     Vital Signs (Most Recent):  Temp: 99.7 °F (37.6 °C) (08/18/18 0342)  Pulse: 90 (08/18/18 0341)  Resp: (!) 23 (08/18/18 0341)  BP: (!) 156/86 (08/18/18 0341)  SpO2: 99 % (08/18/18 0136) Vital Signs (24h Range):  Temp:  [99.4 °F (37.4 °C)-99.7 °F (37.6 °C)] 99.7 °F (37.6 °C)  Pulse:  [] 90  Resp:  [17-31] 23  SpO2:  [95 %-99 %] 99 %  BP: (135-156)/(74-86) 156/86     Weight: 126.1 kg (278 lb)  Body mass index is 38.77 kg/m².    Physical Exam   Constitutional: He is oriented to person, place, and time. He appears well-developed and well-nourished. No distress.   HENT:   Head: Normocephalic and atraumatic.   Right Ear: External ear normal.   Left Ear: External ear normal.   Nose: Nose normal.   Mouth/Throat: Oropharyngeal exudate present.   Eyes: Conjunctivae and EOM are normal. Pupils are equal, round, and reactive to light. Right eye exhibits no discharge. Left eye exhibits no discharge. No scleral icterus.   Neck: Normal range of motion. Neck supple. No JVD present.  No tracheal deviation present. No thyromegaly present.   Cardiovascular: Normal rate, regular rhythm and normal heart sounds. Exam reveals no gallop and no friction rub.   No murmur heard.  Pulmonary/Chest: Effort normal and breath sounds normal. No stridor. No respiratory distress. He has no wheezes. He has no rales. He exhibits no tenderness.   Abdominal: Soft. Bowel sounds are normal. He exhibits no distension and no mass. There is no tenderness. There is no rebound and no guarding. No hernia.   Musculoskeletal: He exhibits edema, tenderness and deformity.   Left knee and right knee tender on ROM   Lymphadenopathy:     He has no cervical adenopathy.   Neurological: He is alert and oriented to person, place, and time. He displays normal reflexes. No cranial nerve deficit or sensory deficit. He exhibits normal muscle tone. Coordination normal.   Skin: Skin is warm. Capillary refill takes 2 to 3 seconds. He is not diaphoretic.   Early elephantiasis with hyperpigmentation. Two lateral right lower extremityx ulcers x 2. Icthyotic feet.   Dirty. Poor hygeine.    Psychiatric: He has a normal mood and affect. His behavior is normal. Judgment and thought content normal.   Nursing note and vitals reviewed.        CRANIAL NERVES     CN III, IV, VI   Pupils are equal, round, and reactive to light.  Extraocular motions are normal.        Significant Labs:   Recent Lab Results       08/18/18  0211 08/17/18  2346 08/17/18  2225      Albumin   3.1     Alkaline Phosphatase   69     ALT   11     Anion Gap 11  14     Appearance, UA  Clear      AST   33     Bacteria, UA  Many      Baso #   0.03     Basophil%   0.4     Bilirubin (UA)  Negative      Total Bilirubin   1.4  Comment:  For infants and newborns, interpretation of results should be based  on gestational age, weight and in agreement with clinical  observations.  Premature Infant recommended reference ranges:  Up to 24 hours.............<8.0 mg/dL  Up to 48  hours............<12.0 mg/dL  3-5 days..................<15.0 mg/dL  6-29 days.................<15.0 mg/dL       BNP   193  Comment:  Values of less than 100 pg/ml are consistent with non-CHF populations.     BUN, Bld 10  10     Calcium 9.2  9.6     Chloride 90  89     CO2 38  37     Color, UA  Yellow      CPK   906        906     Creatinine 0.9  1.0     Differential Method   Automated     eGFR if  >60  >60     eGFR if non  >60  Comment:  Calculation used to obtain the estimated glomerular filtration  rate (eGFR) is the CKD-EPI equation.     >60  Comment:  Calculation used to obtain the estimated glomerular filtration  rate (eGFR) is the CKD-EPI equation.        Eos #   0.0     Eosinophil%   0.5     Glucose 126  146     Glucose, UA  Negative      Gran # (ANC)   6.2     Gran%   74.2     Hematocrit   38.1     Hemoglobin   12.8     Ketones, UA  Negative      Leukocytes, UA  1+      Lymph #   1.4     Lymph%   16.5     Magnesium   1.9     MCH   28.1     MCHC   33.6     MCV   84     Microscopic Comment  SEE COMMENT  Comment:  Other formed elements not mentioned in the report are not   present in the microscopic examination.         Mono #   0.7     Mono%   8.4     MPV   8.3     Nitrite, UA  Negative      Occult Blood UA  3+      pH, UA  7.0      Platelets   272     Potassium 2.6  Comment:  K critical result(s) called and verbal readback obtained from Elodia Murray RN, 08/18/2018 02:46    2.5  Comment:  K critical result(s) called and verbal readback obtained from   Suzanne Bae RN, 08/17/2018 23:30       Total Protein   8.0     Protein, UA  Negative  Comment:  Recommend a 24 hour urine protein or a urine   protein/creatinine ratio if globulin induced proteinuria is  clinically suspected.        RBC   4.56     RBC, UA  5      RDW   14.6     Sodium 139  140     Specific Gravity, UA  1.010      Specimen UA  Urine, Clean Catch      Troponin I   0.016  Comment:  The reference interval for  Troponin I represents the 99th percentile   cutoff   for our facility and is consistent with 3rd generation assay   performance.       Urobilinogen, UA  >=8.0      WBC, UA  5      WBC   8.30           Significant Imaging:   Imaging Results          X-Ray Chest AP Portable (Final result)  Result time 08/17/18 22:59:28    Final result by Ventura Turcios MD (08/17/18 22:59:28)                 Impression:      No acute process seen.      Electronically signed by: Ventura Turcios MD  Date:    08/17/2018  Time:    22:59             Narrative:    EXAMINATION:  XR CHEST AP PORTABLE    CLINICAL HISTORY:  Chest Pain;    FINDINGS:  Single view of the chest.  Aorta demonstrates atherosclerotic disease.    Cardiac silhouette is normal.  The lungs demonstrate no evidence of active disease.  No evidence of pleural effusion or pneumothorax.  Bones demonstrate scattered degenerative changes.                                  Assessment/Plan:     Venous stasis ulcer of right calf limited to breakdown of skin without varicose veins    Local and systemic antibiotics. Wound care consult.           Hypokalemia    Supplement IV and PO and recheck K levels. Will also get magnesium level.      Total critical care time spent on the patient excluding any separaely billable procedures is 37 minutes to include, patient care, discussing with patient, writing orders, evaluating laboratory results.          Non-traumatic rhabdomyolysis    May be secondary to hypokalemia. Will however get phosphorus levels also.             VTE Risk Mitigation (From admission, onward)        Ordered     enoxaparin injection 40 mg  Daily      08/18/18 0347     IP VTE HIGH RISK PATIENT  Once      08/18/18 0347             Joann Jones MD  Department of Hospital Medicine   [facility]

## 2018-08-18 NOTE — SUBJECTIVE & OBJECTIVE
No past medical history on file.    No past surgical history on file.    Review of patient's allergies indicates:  No Known Allergies    No current facility-administered medications on file prior to encounter.      No current outpatient medications on file prior to encounter.     Family History     None        Tobacco Use    Smoking status: Not on file   Substance and Sexual Activity    Alcohol use: Not on file    Drug use: Not on file    Sexual activity: Not on file     Review of Systems   Constitutional: Negative for activity change, appetite change, chills, diaphoresis, fever and unexpected weight change.   HENT: Negative for congestion, drooling, ear discharge, ear pain, postnasal drip, trouble swallowing and voice change.    Eyes: Negative for photophobia, pain, discharge, redness, itching and visual disturbance.   Respiratory: Negative for apnea, choking, chest tightness, shortness of breath and stridor.    Cardiovascular: Positive for leg swelling. Negative for chest pain and palpitations.   Gastrointestinal: Negative for abdominal distention, abdominal pain, anal bleeding, blood in stool, nausea and vomiting.   Genitourinary: Negative for difficulty urinating, dysuria, flank pain, frequency, hematuria, penile swelling, scrotal swelling and urgency.   Musculoskeletal: Positive for arthralgias, gait problem and joint swelling. Negative for myalgias, neck pain and neck stiffness.   Skin: Positive for color change and wound.   Allergic/Immunologic: Negative for environmental allergies, food allergies and immunocompromised state.   Neurological: Negative for dizziness, seizures, syncope, facial asymmetry, speech difficulty, numbness and headaches.   Hematological: Negative for adenopathy. Does not bruise/bleed easily.   Psychiatric/Behavioral: Negative for agitation, behavioral problems, confusion, decreased concentration, dysphoric mood, hallucinations and self-injury. The patient is not nervous/anxious and  is not hyperactive.    All other systems reviewed and are negative.    Objective:     Vital Signs (Most Recent):  Temp: 99.7 °F (37.6 °C) (08/18/18 0342)  Pulse: 90 (08/18/18 0341)  Resp: (!) 23 (08/18/18 0341)  BP: (!) 156/86 (08/18/18 0341)  SpO2: 99 % (08/18/18 0136) Vital Signs (24h Range):  Temp:  [99.4 °F (37.4 °C)-99.7 °F (37.6 °C)] 99.7 °F (37.6 °C)  Pulse:  [] 90  Resp:  [17-31] 23  SpO2:  [95 %-99 %] 99 %  BP: (135-156)/(74-86) 156/86     Weight: 126.1 kg (278 lb)  Body mass index is 38.77 kg/m².    Physical Exam   Constitutional: He is oriented to person, place, and time. He appears well-developed and well-nourished. No distress.   HENT:   Head: Normocephalic and atraumatic.   Right Ear: External ear normal.   Left Ear: External ear normal.   Nose: Nose normal.   Mouth/Throat: Oropharyngeal exudate present.   Eyes: Conjunctivae and EOM are normal. Pupils are equal, round, and reactive to light. Right eye exhibits no discharge. Left eye exhibits no discharge. No scleral icterus.   Neck: Normal range of motion. Neck supple. No JVD present. No tracheal deviation present. No thyromegaly present.   Cardiovascular: Normal rate, regular rhythm and normal heart sounds. Exam reveals no gallop and no friction rub.   No murmur heard.  Pulmonary/Chest: Effort normal and breath sounds normal. No stridor. No respiratory distress. He has no wheezes. He has no rales. He exhibits no tenderness.   Abdominal: Soft. Bowel sounds are normal. He exhibits no distension and no mass. There is no tenderness. There is no rebound and no guarding. No hernia.   Musculoskeletal: He exhibits edema, tenderness and deformity.   Left knee and right knee tender on ROM   Lymphadenopathy:     He has no cervical adenopathy.   Neurological: He is alert and oriented to person, place, and time. He displays normal reflexes. No cranial nerve deficit or sensory deficit. He exhibits normal muscle tone. Coordination normal.   Skin: Skin is warm.  Capillary refill takes 2 to 3 seconds. He is not diaphoretic.   Early elephantiasis with hyperpigmentation. Two lateral right lower extremityx ulcers x 2. Icthyotic feet.   Dirty. Poor hygeine.    Psychiatric: He has a normal mood and affect. His behavior is normal. Judgment and thought content normal.   Nursing note and vitals reviewed.        CRANIAL NERVES     CN III, IV, VI   Pupils are equal, round, and reactive to light.  Extraocular motions are normal.        Significant Labs:   Recent Lab Results       08/18/18  0211 08/17/18  2346 08/17/18  2225      Albumin   3.1     Alkaline Phosphatase   69     ALT   11     Anion Gap 11  14     Appearance, UA  Clear      AST   33     Bacteria, UA  Many      Baso #   0.03     Basophil%   0.4     Bilirubin (UA)  Negative      Total Bilirubin   1.4  Comment:  For infants and newborns, interpretation of results should be based  on gestational age, weight and in agreement with clinical  observations.  Premature Infant recommended reference ranges:  Up to 24 hours.............<8.0 mg/dL  Up to 48 hours............<12.0 mg/dL  3-5 days..................<15.0 mg/dL  6-29 days.................<15.0 mg/dL       BNP   193  Comment:  Values of less than 100 pg/ml are consistent with non-CHF populations.     BUN, Bld 10  10     Calcium 9.2  9.6     Chloride 90  89     CO2 38  37     Color, UA  Yellow      CPK   906        906     Creatinine 0.9  1.0     Differential Method   Automated     eGFR if  >60  >60     eGFR if non  >60  Comment:  Calculation used to obtain the estimated glomerular filtration  rate (eGFR) is the CKD-EPI equation.     >60  Comment:  Calculation used to obtain the estimated glomerular filtration  rate (eGFR) is the CKD-EPI equation.        Eos #   0.0     Eosinophil%   0.5     Glucose 126  146     Glucose, UA  Negative      Gran # (ANC)   6.2     Gran%   74.2     Hematocrit   38.1     Hemoglobin   12.8     Ketones, UA  Negative       Leukocytes, UA  1+      Lymph #   1.4     Lymph%   16.5     Magnesium   1.9     MCH   28.1     MCHC   33.6     MCV   84     Microscopic Comment  SEE COMMENT  Comment:  Other formed elements not mentioned in the report are not   present in the microscopic examination.         Mono #   0.7     Mono%   8.4     MPV   8.3     Nitrite, UA  Negative      Occult Blood UA  3+      pH, UA  7.0      Platelets   272     Potassium 2.6  Comment:  K critical result(s) called and verbal readback obtained from Elodia Murray RN, 08/18/2018 02:46    2.5  Comment:  K critical result(s) called and verbal readback obtained from   Suzanne Bae RN, 08/17/2018 23:30       Total Protein   8.0     Protein, UA  Negative  Comment:  Recommend a 24 hour urine protein or a urine   protein/creatinine ratio if globulin induced proteinuria is  clinically suspected.        RBC   4.56     RBC, UA  5      RDW   14.6     Sodium 139  140     Specific Gravity, UA  1.010      Specimen UA  Urine, Clean Catch      Troponin I   0.016  Comment:  The reference interval for Troponin I represents the 99th percentile   cutoff   for our facility and is consistent with 3rd generation assay   performance.       Urobilinogen, UA  >=8.0      WBC, UA  5      WBC   8.30           Significant Imaging:   Imaging Results          X-Ray Chest AP Portable (Final result)  Result time 08/17/18 22:59:28    Final result by Ventura Turcios MD (08/17/18 22:59:28)                 Impression:      No acute process seen.      Electronically signed by: Ventura Turcios MD  Date:    08/17/2018  Time:    22:59             Narrative:    EXAMINATION:  XR CHEST AP PORTABLE    CLINICAL HISTORY:  Chest Pain;    FINDINGS:  Single view of the chest.  Aorta demonstrates atherosclerotic disease.    Cardiac silhouette is normal.  The lungs demonstrate no evidence of active disease.  No evidence of pleural effusion or pneumothorax.  Bones demonstrate scattered degenerative changes.

## 2018-08-18 NOTE — HPI
The patient is a 78-year old man with Afib, CHF, HTN who presented with bilateral leg swelling and ulceration for about a month. He states he sees both his cardiologist as well as PCP and started having bilateral leg swellings about 8 months ago. He soaked it in bleach hoping it would go away. He had blood work done n the ED indicating early rhabdomyolysis as well as sever hypokalemia.

## 2018-08-18 NOTE — PT/OT/SLP PROGRESS
Occupational Therapy      Patient Name:  Jessee Katz   MRN:  [id number]    PT CHART REVIEW COMPLETED. PT WITH 10/10 LEG PAIN AND REQUEST NO EVAL AT THIS TIME. O.T. EDUCATED PT ON ROLE OF THERAPY AND TO RETURN NEXT VISIT TO COMPLETE EVAL.       Giovanna Eddy OT  8/18/2018

## 2018-08-18 NOTE — ASSESSMENT & PLAN NOTE
Supplement IV and PO and recheck K levels. Will also get magnesium level.      Total critical care time spent on the patient excluding any separaely billable procedures is 37 minutes to include, patient care, discussing with patient, writing orders, evaluating laboratory results.

## 2018-08-19 PROBLEM — R53.1 WEAKNESS: Status: ACTIVE | Noted: 2018-08-19

## 2018-08-19 LAB
DIASTOLIC DYSFUNCTION: NO
INR PPP: 1.9
POCT GLUCOSE: 106 MG/DL (ref 70–110)
POCT GLUCOSE: 122 MG/DL (ref 70–110)
POCT GLUCOSE: 145 MG/DL (ref 70–110)
POCT GLUCOSE: 150 MG/DL (ref 70–110)
PROTHROMBIN TIME: 19.2 SEC
RETIRED EF AND QEF - SEE NOTES: 50 (ref 55–65)

## 2018-08-19 PROCEDURE — G8979 MOBILITY GOAL STATUS: HCPCS | Mod: CJ

## 2018-08-19 PROCEDURE — 63600175 PHARM REV CODE 636 W HCPCS: Mod: JG | Performed by: INTERNAL MEDICINE

## 2018-08-19 PROCEDURE — 25000003 PHARM REV CODE 250: Performed by: INTERNAL MEDICINE

## 2018-08-19 PROCEDURE — 87077 CULTURE AEROBIC IDENTIFY: CPT

## 2018-08-19 PROCEDURE — 36415 COLL VENOUS BLD VENIPUNCTURE: CPT

## 2018-08-19 PROCEDURE — G8978 MOBILITY CURRENT STATUS: HCPCS | Mod: CK

## 2018-08-19 PROCEDURE — G0378 HOSPITAL OBSERVATION PER HR: HCPCS

## 2018-08-19 PROCEDURE — 97162 PT EVAL MOD COMPLEX 30 MIN: CPT

## 2018-08-19 PROCEDURE — 85610 PROTHROMBIN TIME: CPT

## 2018-08-19 PROCEDURE — 97530 THERAPEUTIC ACTIVITIES: CPT

## 2018-08-19 PROCEDURE — 25000003 PHARM REV CODE 250: Performed by: FAMILY MEDICINE

## 2018-08-19 PROCEDURE — 25000003 PHARM REV CODE 250: Performed by: NURSE PRACTITIONER

## 2018-08-19 PROCEDURE — G8988 SELF CARE GOAL STATUS: HCPCS | Mod: CK

## 2018-08-19 PROCEDURE — 63600175 PHARM REV CODE 636 W HCPCS: Performed by: INTERNAL MEDICINE

## 2018-08-19 PROCEDURE — G8987 SELF CARE CURRENT STATUS: HCPCS | Mod: CL

## 2018-08-19 PROCEDURE — 87186 SC STD MICRODIL/AGAR DIL: CPT

## 2018-08-19 PROCEDURE — 97166 OT EVAL MOD COMPLEX 45 MIN: CPT

## 2018-08-19 PROCEDURE — 94760 N-INVAS EAR/PLS OXIMETRY 1: CPT

## 2018-08-19 PROCEDURE — 63600175 PHARM REV CODE 636 W HCPCS: Performed by: FAMILY MEDICINE

## 2018-08-19 PROCEDURE — 21400001 HC TELEMETRY ROOM

## 2018-08-19 PROCEDURE — 97110 THERAPEUTIC EXERCISES: CPT

## 2018-08-19 PROCEDURE — 87070 CULTURE OTHR SPECIMN AEROBIC: CPT

## 2018-08-19 RX ORDER — WARFARIN SODIUM 5 MG/1
5 TABLET ORAL
Status: DISCONTINUED | OUTPATIENT
Start: 2018-08-19 | End: 2018-08-21 | Stop reason: HOSPADM

## 2018-08-19 RX ORDER — WARFARIN SODIUM 5 MG/1
5 TABLET ORAL
Status: DISCONTINUED | OUTPATIENT
Start: 2018-08-24 | End: 2018-08-21 | Stop reason: HOSPADM

## 2018-08-19 RX ORDER — POTASSIUM CHLORIDE 20 MEQ/1
40 TABLET, EXTENDED RELEASE ORAL ONCE
Status: COMPLETED | OUTPATIENT
Start: 2018-08-19 | End: 2018-08-19

## 2018-08-19 RX ORDER — LISINOPRIL 5 MG/1
5 TABLET ORAL DAILY
Status: DISCONTINUED | OUTPATIENT
Start: 2018-08-19 | End: 2018-08-21 | Stop reason: HOSPADM

## 2018-08-19 RX ORDER — WARFARIN SODIUM 5 MG/1
5 TABLET ORAL DAILY
Status: DISCONTINUED | OUTPATIENT
Start: 2018-08-19 | End: 2018-08-19 | Stop reason: DRUGHIGH

## 2018-08-19 RX ORDER — WARFARIN SODIUM 5 MG/1
5 TABLET ORAL
Status: DISCONTINUED | OUTPATIENT
Start: 2018-08-22 | End: 2018-08-21 | Stop reason: HOSPADM

## 2018-08-19 RX ORDER — DIGOXIN 125 MCG
250 TABLET ORAL DAILY
Status: DISCONTINUED | OUTPATIENT
Start: 2018-08-19 | End: 2018-08-21 | Stop reason: HOSPADM

## 2018-08-19 RX ORDER — ATORVASTATIN CALCIUM 10 MG/1
10 TABLET, FILM COATED ORAL DAILY
Status: DISCONTINUED | OUTPATIENT
Start: 2018-08-19 | End: 2018-08-21 | Stop reason: HOSPADM

## 2018-08-19 RX ORDER — DIGOXIN 125 MCG
250 TABLET ORAL DAILY
Status: DISCONTINUED | OUTPATIENT
Start: 2018-08-19 | End: 2018-08-19

## 2018-08-19 RX ADMIN — ENOXAPARIN SODIUM 40 MG: 100 INJECTION SUBCUTANEOUS at 04:08

## 2018-08-19 RX ADMIN — CEFTAROLINE FOSAMIL 600 MG: 600 POWDER, FOR SOLUTION INTRAVENOUS at 09:08

## 2018-08-19 RX ADMIN — DIGOXIN 250 MCG: 125 TABLET ORAL at 01:08

## 2018-08-19 RX ADMIN — BACITRACIN, NEOMYCIN, POLYMYXIN B: 400; 3.5; 5 OINTMENT TOPICAL at 09:08

## 2018-08-19 RX ADMIN — HYDROCODONE BITARTRATE AND ACETAMINOPHEN 1 TABLET: 10; 325 TABLET ORAL at 08:08

## 2018-08-19 RX ADMIN — ATORVASTATIN CALCIUM 10 MG: 10 TABLET, FILM COATED ORAL at 08:08

## 2018-08-19 RX ADMIN — BACITRACIN, NEOMYCIN, POLYMYXIN B 1 EACH: 400; 3.5; 5 OINTMENT TOPICAL at 08:08

## 2018-08-19 RX ADMIN — WARFARIN SODIUM 5 MG: 5 TABLET ORAL at 04:08

## 2018-08-19 RX ADMIN — IBUPROFEN 400 MG: 400 TABLET, FILM COATED ORAL at 05:08

## 2018-08-19 RX ADMIN — CEFTAROLINE FOSAMIL 600 MG: 600 POWDER, FOR SOLUTION INTRAVENOUS at 08:08

## 2018-08-19 RX ADMIN — POTASSIUM CHLORIDE 40 MEQ: 1500 TABLET, EXTENDED RELEASE ORAL at 01:08

## 2018-08-19 RX ADMIN — LISINOPRIL 5 MG: 5 TABLET ORAL at 08:08

## 2018-08-19 RX ADMIN — FUROSEMIDE 40 MG: 10 INJECTION, SOLUTION INTRAMUSCULAR; INTRAVENOUS at 08:08

## 2018-08-19 NOTE — PT/OT/SLP EVAL
Occupational Therapy   Evaluation    Name: Jessee Katz  MRN: [id number]  Admitting Diagnosis:  <principal problem not specified>      Recommendations:     Discharge Recommendations: nursing facility, skilled, LTACH (long term acute care hospital)  Discharge Equipment Recommendations:  bath bench  Barriers to discharge:  Decreased caregiver support    History:     Occupational Profile:  Living Environment: LIVES ALONE IN ONE STORY HOUSE, ONE STEP UP INTO THE KITCHEN NO HAND RAILS  Previous level of function: MOD I TO INDEPENDENT, AMBULATED WITH RW PRN  Roles and Routines: DRIVES, DOES NOT WORK  Equipment Used at Home:  walker, rolling, bedside commode  Assistance upon Discharge: LIMITED    No past medical history on file.    No past surgical history on file.    Subjective     Chief Complaint: LE EDEMA  Patient/Family Comments/goals: RETURN TO PLOF    Pain/Comfort:  · Pain Rating 1: 0/10  · Pain Rating Post-Intervention 1: 0/10    Patients cultural, spiritual, Yazidi conflicts given the current situation:      Objective:     Communicated with: NURSE FONTANA prior to session.  Patient found all lines intact, call button in reach, bed alarm on and NURSING notified and bed alarm, peripheral IV, telemetry upon OT entry to room.    General Precautions: Standard, fall   Orthopedic Precautions:N/A   Braces: N/A     Occupational Performance:    Bed Mobility:    · Patient completed Rolling/Turning to Right with minimum assistance  · Patient completed Scooting/Bridging with minimum assistance  · Patient completed Supine to Sit with minimum assistance  · Patient completed Sit to Supine with maximal assistance    Functional Mobility/Transfers:  · Patient completed Sit <> Stand Transfer with minimum assistance  with  rolling walker   · Functional Mobility: PT TOOK ~3 LEFT SIDE STEPS WITH RW MIN A TOWARDS HOB    Activities of Daily Living:  · Lower Body Dressing: dependence SOCKS    Cognitive/Visual  "Perceptual:  Cognitive/Psychosocial Skills:     -       Oriented to: Person, Place, Time and Situation   -       Follows Commands/attention:Follows multistep  commands  -       Memory: No Deficits noted  -       Safety awareness/insight to disability: impaired     Physical Exam:  Balance:    -       POOR+  Postural examination/scapula alignment:    -       Rounded shoulders  Edema:  Pitting WEEPING EDEMA TO BLE'S  Upper Extremity Range of Motion:     -       Right Upper Extremity: WFL AROM  -       Left Upper Extremity: WFL AROM AROM  Upper Extremity Strength:    -       Right Upper Extremity: GROSSLY 3+/5  -       Left Upper Extremity: GROSSLY 3+/5    AMPAC 6 Click ADL:  AMPAC Total Score: 14    Treatment & Education:  PT PARTICIPATED IN INITIAL OT EVALUATION TODAY TO ASSESS CURRENT LEVEL OF FUNCTION. PLEASE SEE ABOVE FOR FINDINGS. PT TO BENEFIT FROM SKILLED OT SERVICES TO ADDRESS FUNCTIONAL DEFICITS, TO INCREASE INDEPENDENCE AND SAFETY AWARENESS, AS PT LIVES ALONE.  Education:    Patient left supine with all lines intact, call button in reach, bed alarm on and NURSING notified    Assessment:     Jessee Katz is a 78 y.o. male with a medical diagnosis of <principal problem not specified>.  He presents with the following performance deficits affecting function: weakness, impaired endurance, gait instability, impaired functional mobilty, impaired self care skills, impaired balance, decreased lower extremity function, decreased coordination, decreased safety awareness, impaired coordination, edema.      Rehab Prognosis: Good; patient would benefit from acute skilled OT services to address these deficits and reach maximum level of function.         Clinical Decision Makin.  OT Mod:  "Pt evaluation falls under moderate complexity for evaluation coding due to identification of 3-5 performance deficits noted as stated above. Eval required Min/Mod assistance to complete on this date and detailed assessment(s) were " "utilized. Moreover, an expanded review of history and occupational profile obtained with additional review of cognitive, physical and psychosocial hx."     Plan:     Patient to be seen 3 x/week to address the above listed problems via self-care/home management, therapeutic activities, therapeutic exercises  · Plan of Care Expires: 08/26/18  · Plan of Care Reviewed with: patient    This Plan of care has been discussed with the patient who was involved in its development and understands and is in agreement with the identified goals and treatment plan    GOALS:   Multidisciplinary Problems     Occupational Therapy Goals        Problem: Occupational Therapy Goal    Goal Priority Disciplines Outcome Interventions   Occupational Therapy Goal     OT, PT/OT     Description:  Goals to be met by: 8/26/18     Patient will increase functional independence with ADLs by performing:    LE Dressing with Maximum Assistance.  Grooming while seated at sink with Supervision.  Toileting from bedside commode with Moderate Assistance for hygiene and clothing management.   Toilet transfer to bedside commode with Minimal Assistance.  Upper extremity exercise program x10 reps per handout, with supervision.                      Time Tracking:     OT Date of Treatment: 08/19/18  OT Start Time: 1015  OT Stop Time: 1040  OT Total Time (min): 25 min    Billable Minutes:Evaluation 15  Therapeutic Activity 10    Giovanna Eddy OT  8/19/2018    "

## 2018-08-19 NOTE — SUBJECTIVE & OBJECTIVE
Interval History: Pt was sleeping but upon entering room, pt started to complain of pain. Pt states when he is started he gets severe crampy shooting leg pain.     Review of Systems   All other systems reviewed and are negative.    Objective:     Vital Signs (Most Recent):  Temp: 97 °F (36.1 °C) (08/19/18 1515)  Pulse: 93 (08/19/18 1515)  Resp: 18 (08/19/18 1515)  BP: 136/81 (08/19/18 1515)  SpO2: 95 % (08/19/18 1515) Vital Signs (24h Range):  Temp:  [97 °F (36.1 °C)-99.3 °F (37.4 °C)] 97 °F (36.1 °C)  Pulse:  [] 93  Resp:  [16-20] 18  SpO2:  [95 %-96 %] 95 %  BP: (132-178)/(76-90) 136/81     Weight: 126.1 kg (278 lb)  Body mass index is 38.77 kg/m².    Intake/Output Summary (Last 24 hours) at 8/19/2018 1758  Last data filed at 8/19/2018 1600  Gross per 24 hour   Intake 640 ml   Output 1575 ml   Net -935 ml      Physical Exam   Constitutional: He appears well-developed. No distress.   HENT:   Head: Normocephalic and atraumatic.   Nose: Nose normal.   Eyes: Conjunctivae and EOM are normal.   Neck: Normal range of motion. Neck supple.   Cardiovascular: Normal rate, regular rhythm, normal heart sounds and intact distal pulses.   No murmur heard.  Pulmonary/Chest: Effort normal and breath sounds normal. He has no wheezes.   Abdominal: Soft. Bowel sounds are normal. He exhibits no mass. There is no tenderness. There is no rebound and no guarding.   Musculoskeletal: He exhibits tenderness and deformity. He exhibits no edema.   Hypertrophy, thickened, and hyperpigmentation on B/L lower extremities. One weeping wound lesion on Left calf. Foul odor. Limited ROM       Neurological: He is alert.   Skin: Skin is warm and dry. No rash noted.   Psychiatric: He has a normal mood and affect. His behavior is normal.   Nursing note and vitals reviewed.      Significant Labs:   CBC:   Recent Labs   Lab  08/17/18   2225  08/18/18   0531   WBC  8.30  7.79   HGB  12.8*  12.8*   HCT  38.1*  38.8*   PLT  272  266     CMP:   Recent  Labs   Lab  08/17/18   2225  08/18/18   0211  08/18/18   0531   NA  140  139  140   K  2.5*  2.6*  3.0*   CL  89*  90*  91*   CO2  37*  38*  38*   GLU  146*  126*  124*   BUN  10  10  10   CREATININE  1.0  0.9  1.0   CALCIUM  9.6  9.2  9.4   PROT  8.0   --    --    ALBUMIN  3.1*   --    --    BILITOT  1.4*   --    --    ALKPHOS  69   --    --    AST  33   --    --    ALT  11   --    --    ANIONGAP  14  11  11   EGFRNONAA  >60  >60  >60       Significant Imaging: I have reviewed all pertinent imaging results/findings within the past 24 hours.

## 2018-08-19 NOTE — PLAN OF CARE
Sw met with pt at bedside to complete assessment. Sw explained role/purpose of visit. Transitional navigator was provided to pt. Pt reports he needs to dc to SNF placement. Sw provided pt with facilities his insurance covers. Pt choice form was signed and placed in chart for [facility]. Sw will send referral through [facility]. Pt's pcp is Олег Ribeiro MD. Pt uses   [id number] - [place], [place] - [address]  [address] [address]  Phone: [phone] Fax: [phone]       08/19/18 3620   Discharge Assessment   Assessment Type Discharge Planning Assessment   Confirmed/corrected address and phone number on facesheet? Yes   Assessment information obtained from? Patient   Prior to hospitilization cognitive status: Alert/Oriented   Prior to hospitalization functional status: Assistive Equipment   Current cognitive status: Alert/Oriented   Current Functional Status: Assistive Equipment;Needs Assistance   Lives With alone   Able to Return to Prior Arrangements yes   Is patient able to care for self after discharge? No   Patient's perception of discharge disposition skilled nursing facility   Readmission Within The Last 30 Days no previous admission in last 30 days   Patient currently being followed by outpatient case management? No   Patient currently receives any other outside agency services? No   Equipment Currently Used at Home walker, rolling   Do you have any problems affording any of your prescribed medications? No   Is the patient taking medications as prescribed? yes   Does the patient have transportation home? Yes   Transportation Available other (see comments)  (SNF will provide dc transportation.)   Does the patient receive services at the [facility]? No   Discharge Plan A Skilled Nursing Facility   Patient/Family In Agreement With Plan yes

## 2018-08-19 NOTE — PLAN OF CARE
Problem: Patient Care Overview  Goal: Plan of Care Review  Outcome: Ongoing (interventions implemented as appropriate)  Patient remains free from falls or injury this shift, safety measures in place. Patient working with therapy this shift. VS stable, Afib on the monitor throughout the shift. Medications administered and wound care done per order. Patient tolerating treatment. Blood glucose monitoring. Denies any needs or complaints at this time. Call light and belongings within reach. Will continue to monitor.

## 2018-08-19 NOTE — PROGRESS NOTES
[facility] Hospital Medicine  Progress Note    Patient Name: Jessee Katz  MRN: [id number]  Patient Class: OP- Observation   Admission Date: 8/17/2018  Length of Stay: 1 days  Attending Physician: Sulema Eason MD  Primary Care Provider: Олег Ribeiro MD        Subjective:     Principal Problem:<principal problem not specified>    HPI:  The patient is a 78-year old man presenting with bilateral leg swelling and ulceration for about a month. He states he sees both his cardiologist as well as PCP and started having bilateral leg swellings about 8 months ago. He soaked it in bleach hoping it would go away. He had blood work done n the E indicating early rhabdomyolysis as well as sever hypokalemia.     Hospital Course:  Pt started on Lasix for edema. Wound cx pending. Pt wants SNF but not participating in PT/OT due to pain. Discussed pt has to willing to work with PT/OT to be accepted to SNF.    Interval History: Pt was sleeping but upon entering room, pt started to complain of pain. Pt states when he is started he gets severe crampy shooting leg pain.     Review of Systems   All other systems reviewed and are negative.    Objective:     Vital Signs (Most Recent):  Temp: 97 °F (36.1 °C) (08/19/18 1515)  Pulse: 93 (08/19/18 1515)  Resp: 18 (08/19/18 1515)  BP: 136/81 (08/19/18 1515)  SpO2: 95 % (08/19/18 1515) Vital Signs (24h Range):  Temp:  [97 °F (36.1 °C)-99.3 °F (37.4 °C)] 97 °F (36.1 °C)  Pulse:  [] 93  Resp:  [16-20] 18  SpO2:  [95 %-96 %] 95 %  BP: (132-178)/(76-90) 136/81     Weight: 126.1 kg (278 lb)  Body mass index is 38.77 kg/m².    Intake/Output Summary (Last 24 hours) at 8/19/2018 7608  Last data filed at 8/19/2018 1600  Gross per 24 hour   Intake 640 ml   Output 1575 ml   Net -935 ml      Physical Exam   Constitutional: He appears well-developed. No distress.   HENT:   Head: Normocephalic and atraumatic.   Nose: Nose normal.   Eyes: Conjunctivae and EOM are normal.    Neck: Normal range of motion. Neck supple.   Cardiovascular: Normal rate, regular rhythm, normal heart sounds and intact distal pulses.   No murmur heard.  Pulmonary/Chest: Effort normal and breath sounds normal. He has no wheezes.   Abdominal: Soft. Bowel sounds are normal. He exhibits no mass. There is no tenderness. There is no rebound and no guarding.   Musculoskeletal: He exhibits tenderness and deformity. He exhibits no edema.   Hypertrophy, thickened, and hyperpigmentation on B/L lower extremities. One weeping wound lesion on Left calf. Foul odor. Limited ROM       Neurological: He is alert.   Skin: Skin is warm and dry. No rash noted.   Psychiatric: He has a normal mood and affect. His behavior is normal.   Nursing note and vitals reviewed.      Significant Labs:   CBC:   Recent Labs   Lab  08/17/18 2225 08/18/18   0531   WBC  8.30  7.79   HGB  12.8*  12.8*   HCT  38.1*  38.8*   PLT  272  266     CMP:   Recent Labs   Lab  08/17/18 2225 08/18/18   0211  08/18/18   0531   NA  140  139  140   K  2.5*  2.6*  3.0*   CL  89*  90*  91*   CO2  37*  38*  38*   GLU  146*  126*  124*   BUN  10  10  10   CREATININE  1.0  0.9  1.0   CALCIUM  9.6  9.2  9.4   PROT  8.0   --    --    ALBUMIN  3.1*   --    --    BILITOT  1.4*   --    --    ALKPHOS  69   --    --    AST  33   --    --    ALT  11   --    --    ANIONGAP  14  11  11   EGFRNONAA  >60  >60  >60       Significant Imaging: I have reviewed all pertinent imaging results/findings within the past 24 hours.    Assessment/Plan:      Venous stasis ulcer of right calf limited to breakdown of skin without varicose veins    Local and systemic antibiotics.   Wound care consult.   Wound cx pending        Hypokalemia    Replete PRN          Non-traumatic rhabdomyolysis    stable          Venous stasis of both lower extremities    Venous doppler neg for DVT  Encourage ambulating with PT/OT            VTE Risk Mitigation (From admission, onward)        Ordered     warfarin  (COUMADIN) tablet 5 mg  Every Friday 08/19/18 1148     warfarin (COUMADIN) tablet 5 mg  Every Wednesday 08/19/18 1146     warfarin tablet 7.5 mg  Every Monday 08/19/18 1149     warfarin (COUMADIN) tablet 5 mg  Every Tues, Thurs, Sat, Sun      08/19/18 1138     enoxaparin injection 40 mg  Daily      08/18/18 0347     IP VTE HIGH RISK PATIENT  Once      08/18/18 0347              Sulema Eason MD  Department of Hospital Medicine   [facility]

## 2018-08-19 NOTE — PLAN OF CARE
Brown given to pt. Pt did not sign unable to see without his glasses.     08/19/18 1503   BROWN Message   Medicare Outpatient and Observation Notification regarding financial responsibility Given to patient/caregiver;Explained to patient/caregiver;Other (comments)   Date BROWN was signed 08/19/18   Time BROWN was signed [id number]

## 2018-08-19 NOTE — PLAN OF CARE
Problem: Patient Care Overview  Goal: Plan of Care Review  Outcome: Ongoing (interventions implemented as appropriate)   08/18/18 1958   Coping/Psychosocial   Plan Of Care Reviewed With patient     Cardiac, vital signs, and lab monitoring. IV antibiotics. Increase activity as tolerated. Bed alarm on. Watch for falls. Watch for signs and symptoms of bleeding. Accuchecks per order. Elevate bilateral lower extremities.

## 2018-08-19 NOTE — HOSPITAL COURSE
The pt was placed in observation with decompensated CHF with BLE edema on IV lasix and infected Left LE venous stasis ulcer on oral Augmentin. Wound nurse performed wound care to venous stasis ulcer. Wound cx showed enterococcus. Will discharge pt on oral Zyvox. Wound culture sensitivities pending. Cardiac echo showed mildly depressed systolic function EF 50-55%. Pt reports diet and fluid restriction indiscretion. Pt counseled on CHF diet and fluid restriction. Pt will be discharged back on home medication Lasix 40mg po BID.   Pt presented with hypokalemia. Potassium repleted- pt will be discharged on KCL 20meq daily.   Afib with RVR on admit- rate . Coreg added and pt cont on digoxin. HR improved.   Pt also complain of burning pain to lower abdomen, buttock, and BLE . No midline TTP to cervical, thoracic or lumbar spine. Pt placed on gabapentin and pain improved.   UA showed +1 leuk. Pt is asymptomatic. Urine culture pending.   Pt requests SNF - Pt will be discharged to [facility]F for PT/OT 5 times/week.

## 2018-08-20 PROBLEM — I48.91 ATRIAL FIBRILLATION WITH RVR: Status: ACTIVE | Noted: 2018-08-20

## 2018-08-20 PROBLEM — L97.221 VENOUS STASIS ULCER OF LEFT CALF LIMITED TO BREAKDOWN OF SKIN WITHOUT VARICOSE VEINS: Status: ACTIVE | Noted: 2018-08-18

## 2018-08-20 PROBLEM — I50.23 SYSTOLIC CHF, ACUTE ON CHRONIC: Status: ACTIVE | Noted: 2018-08-20

## 2018-08-20 PROBLEM — M79.604 PAIN IN BOTH LOWER EXTREMITIES: Status: ACTIVE | Noted: 2018-08-20

## 2018-08-20 PROBLEM — M79.605 PAIN IN BOTH LOWER EXTREMITIES: Status: ACTIVE | Noted: 2018-08-20

## 2018-08-20 PROBLEM — N39.0 UTI (URINARY TRACT INFECTION): Status: ACTIVE | Noted: 2018-08-20

## 2018-08-20 LAB
ANION GAP SERPL CALC-SCNC: 9 MMOL/L
BASOPHILS # BLD AUTO: 0.03 K/UL
BASOPHILS NFR BLD: 0.4 %
BUN SERPL-MCNC: 11 MG/DL
CALCIUM SERPL-MCNC: 8.8 MG/DL
CHLORIDE SERPL-SCNC: 92 MMOL/L
CK SERPL-CCNC: 390 U/L
CO2 SERPL-SCNC: 35 MMOL/L
CREAT SERPL-MCNC: 0.9 MG/DL
DIFFERENTIAL METHOD: ABNORMAL
EOSINOPHIL # BLD AUTO: 0.3 K/UL
EOSINOPHIL NFR BLD: 3.2 %
ERYTHROCYTE [DISTWIDTH] IN BLOOD BY AUTOMATED COUNT: 14.7 %
EST. GFR  (AFRICAN AMERICAN): >60 ML/MIN/1.73 M^2
EST. GFR  (NON AFRICAN AMERICAN): >60 ML/MIN/1.73 M^2
GLUCOSE SERPL-MCNC: 94 MG/DL
HCT VFR BLD AUTO: 34.3 %
HGB BLD-MCNC: 11.2 G/DL
INR PPP: 1.5
LYMPHOCYTES # BLD AUTO: 1.5 K/UL
LYMPHOCYTES NFR BLD: 18.8 %
MCH RBC QN AUTO: 27.2 PG
MCHC RBC AUTO-ENTMCNC: 32.7 G/DL
MCV RBC AUTO: 83 FL
MONOCYTES # BLD AUTO: 0.5 K/UL
MONOCYTES NFR BLD: 6.6 %
NEUTROPHILS # BLD AUTO: 5.6 K/UL
NEUTROPHILS NFR BLD: 71 %
PLATELET # BLD AUTO: 284 K/UL
PMV BLD AUTO: 8.9 FL
POCT GLUCOSE: 105 MG/DL (ref 70–110)
POCT GLUCOSE: 129 MG/DL (ref 70–110)
POCT GLUCOSE: 147 MG/DL (ref 70–110)
POCT GLUCOSE: 97 MG/DL (ref 70–110)
POTASSIUM SERPL-SCNC: 3.1 MMOL/L
PROTHROMBIN TIME: 15.5 SEC
RBC # BLD AUTO: 4.12 M/UL
SODIUM SERPL-SCNC: 136 MMOL/L
WBC # BLD AUTO: 7.83 K/UL

## 2018-08-20 PROCEDURE — 85610 PROTHROMBIN TIME: CPT

## 2018-08-20 PROCEDURE — 25000003 PHARM REV CODE 250: Performed by: NURSE PRACTITIONER

## 2018-08-20 PROCEDURE — 63600175 PHARM REV CODE 636 W HCPCS: Performed by: INTERNAL MEDICINE

## 2018-08-20 PROCEDURE — 63600175 PHARM REV CODE 636 W HCPCS: Performed by: FAMILY MEDICINE

## 2018-08-20 PROCEDURE — 36415 COLL VENOUS BLD VENIPUNCTURE: CPT

## 2018-08-20 PROCEDURE — 63600175 PHARM REV CODE 636 W HCPCS: Mod: JG | Performed by: INTERNAL MEDICINE

## 2018-08-20 PROCEDURE — 85025 COMPLETE CBC W/AUTO DIFF WBC: CPT

## 2018-08-20 PROCEDURE — 87086 URINE CULTURE/COLONY COUNT: CPT

## 2018-08-20 PROCEDURE — 21400001 HC TELEMETRY ROOM

## 2018-08-20 PROCEDURE — 80048 BASIC METABOLIC PNL TOTAL CA: CPT

## 2018-08-20 PROCEDURE — 25000003 PHARM REV CODE 250: Performed by: INTERNAL MEDICINE

## 2018-08-20 PROCEDURE — 82550 ASSAY OF CK (CPK): CPT

## 2018-08-20 PROCEDURE — 96372 THER/PROPH/DIAG INJ SC/IM: CPT

## 2018-08-20 PROCEDURE — 63600175 PHARM REV CODE 636 W HCPCS: Performed by: NURSE PRACTITIONER

## 2018-08-20 PROCEDURE — 25000003 PHARM REV CODE 250: Performed by: FAMILY MEDICINE

## 2018-08-20 RX ORDER — POTASSIUM CHLORIDE 20 MEQ/1
20 TABLET, EXTENDED RELEASE ORAL DAILY
Status: DISCONTINUED | OUTPATIENT
Start: 2018-08-21 | End: 2018-08-21 | Stop reason: HOSPADM

## 2018-08-20 RX ORDER — CARVEDILOL 3.12 MG/1
3.12 TABLET ORAL 2 TIMES DAILY
Status: DISCONTINUED | OUTPATIENT
Start: 2018-08-20 | End: 2018-08-21 | Stop reason: HOSPADM

## 2018-08-20 RX ORDER — POTASSIUM CHLORIDE 20 MEQ/1
60 TABLET, EXTENDED RELEASE ORAL ONCE
Status: COMPLETED | OUTPATIENT
Start: 2018-08-20 | End: 2018-08-20

## 2018-08-20 RX ORDER — AMOXICILLIN AND CLAVULANATE POTASSIUM 875; 125 MG/1; MG/1
1 TABLET, FILM COATED ORAL EVERY 12 HOURS
Status: DISCONTINUED | OUTPATIENT
Start: 2018-08-20 | End: 2018-08-21 | Stop reason: HOSPADM

## 2018-08-20 RX ORDER — GABAPENTIN 100 MG/1
200 CAPSULE ORAL 3 TIMES DAILY
Status: DISCONTINUED | OUTPATIENT
Start: 2018-08-20 | End: 2018-08-21 | Stop reason: HOSPADM

## 2018-08-20 RX ORDER — FUROSEMIDE 10 MG/ML
40 INJECTION INTRAMUSCULAR; INTRAVENOUS 2 TIMES DAILY
Status: DISCONTINUED | OUTPATIENT
Start: 2018-08-20 | End: 2018-08-21 | Stop reason: HOSPADM

## 2018-08-20 RX ADMIN — HYDROCODONE BITARTRATE AND ACETAMINOPHEN 1 TABLET: 10; 325 TABLET ORAL at 01:08

## 2018-08-20 RX ADMIN — GABAPENTIN 200 MG: 100 CAPSULE ORAL at 03:08

## 2018-08-20 RX ADMIN — LISINOPRIL 5 MG: 5 TABLET ORAL at 08:08

## 2018-08-20 RX ADMIN — GABAPENTIN 200 MG: 100 CAPSULE ORAL at 11:08

## 2018-08-20 RX ADMIN — GABAPENTIN 200 MG: 100 CAPSULE ORAL at 10:08

## 2018-08-20 RX ADMIN — AMOXICILLIN AND CLAVULANATE POTASSIUM 1 TABLET: 875; 125 TABLET, FILM COATED ORAL at 11:08

## 2018-08-20 RX ADMIN — WARFARIN SODIUM 7.5 MG: 2.5 TABLET ORAL at 05:08

## 2018-08-20 RX ADMIN — FUROSEMIDE 40 MG: 10 INJECTION, SOLUTION INTRAMUSCULAR; INTRAVENOUS at 05:08

## 2018-08-20 RX ADMIN — CEFTAROLINE FOSAMIL 600 MG: 600 POWDER, FOR SOLUTION INTRAVENOUS at 09:08

## 2018-08-20 RX ADMIN — BACITRACIN, NEOMYCIN, POLYMYXIN B: 400; 3.5; 5 OINTMENT TOPICAL at 08:08

## 2018-08-20 RX ADMIN — POTASSIUM CHLORIDE 60 MEQ: 1500 TABLET, EXTENDED RELEASE ORAL at 08:08

## 2018-08-20 RX ADMIN — BACITRACIN, NEOMYCIN, POLYMYXIN B: 400; 3.5; 5 OINTMENT TOPICAL at 10:08

## 2018-08-20 RX ADMIN — POLYETHYLENE GLYCOL 3350 17 G: 17 POWDER, FOR SOLUTION ORAL at 08:08

## 2018-08-20 RX ADMIN — ATORVASTATIN CALCIUM 10 MG: 10 TABLET, FILM COATED ORAL at 08:08

## 2018-08-20 RX ADMIN — ENOXAPARIN SODIUM 40 MG: 100 INJECTION SUBCUTANEOUS at 05:08

## 2018-08-20 RX ADMIN — CARVEDILOL 3.12 MG: 3.12 TABLET, FILM COATED ORAL at 10:08

## 2018-08-20 RX ADMIN — DIGOXIN 250 MCG: 125 TABLET ORAL at 08:08

## 2018-08-20 RX ADMIN — CARVEDILOL 3.12 MG: 3.12 TABLET, FILM COATED ORAL at 01:08

## 2018-08-20 RX ADMIN — FUROSEMIDE 40 MG: 10 INJECTION, SOLUTION INTRAMUSCULAR; INTRAVENOUS at 08:08

## 2018-08-20 RX ADMIN — AMOXICILLIN AND CLAVULANATE POTASSIUM 1 TABLET: 875; 125 TABLET, FILM COATED ORAL at 10:08

## 2018-08-20 NOTE — ASSESSMENT & PLAN NOTE
IV lasix  I/O  Daily weights  Obtain medical records from Dr. Angelito Ribeiro   Cont Lisinopril and digoxin   Add Coreg

## 2018-08-20 NOTE — PROGRESS NOTES
Clinical Pharmacy Progress Note: Coumadin Dosing and Monitoring     Goal INR: 2-3   Indication: atrial fibrillation w/ RVR   Lab Results   Component Value Date    INR 1.5 (H) 08/20/2018    INR 1.9 (H) 08/19/2018    INR 1.9 (H) 08/18/2018   Patient has not yet been educated by pharmacist this admission.   Current dose: 5 mg every day except for Monday, 7.5 mg on Monday     Plan: PT/INR will be monitored daily. Dose adjustments will be made accordingly.      Thank you for allowing us to participate in this patient's care.    Mine Yu, PharmD 8/20/2018 1:05 PM

## 2018-08-20 NOTE — CONSULTS
Consulted on this 79 y/o M patient due to present on admission VLU to [facility]. Patient denies pain at present. BLE assessed.  Patient states at home, he soaked his legs in bleach, but can not otherwise reach his BLE to care for them.  3+ edema noted to BLE, thickened dry skin noted to Bilat feet and legs.  RLE lateral shin ulcer noted measuring 1x1x0.1cm with moist red granulating wound bed and scant serous drainage. Cleansed with saline and patted dry.  Painted ro wound with cavilon, and foam dressing applied.    LLE lateral shin ulcer noted measuring 2x1.5x0.1cm with moist red granulating wound bed and scant serous drainage. Cleansed with saline and patted dry. Painted ro wound with cavilon and foam dressing applied.  Multiple darkened blistered areas noted to right shin, and BLE with surrounding dry flaky skin. Cleansed with bath wipes and Sween 24 cream applied to BLE.  Encouraged to keep BLE elevated in bed with pillows at this time.  Patient needs compression, but will require vascular studies and CAROLYNN results prior to applying any compression. If discharged to home, he will need HH vs. OP wound clinic for compression therapy (if his ABIs are within limits) or this can be done in SNF as well. Please see below for wound care recommendations:    BLE VLU:  1. Cleanse with saline  2. Pat dry  3. Paint ro wound with cavilon  4. Apply aquacel foam dressing  5. Change every 3 days    Dry flaky skin BLE:  1. Cleanse with bath wipes every day  2. Apply sween 24 cream daily  3. Elevate BLE at all times

## 2018-08-20 NOTE — PT/OT/SLP PROGRESS
Physical Therapy      Patient Name:  Jessee Katz   MRN:  [id number]    Patient found lying supine with HOB and B LE elevated. Patient came supine to sit with min A and use of PT hand for him to pull-up. Patient sat on EOB x 15 minutes with sba and ue support prn. Patient came sit to stand with RW and bed height elevated with min A only. Patient was also able to stand with RW min A and take small slide steps to his R before sitting back on bed min A. Patient had the most difficulty with sit position to getting back into bed supine - he required max A for PT to lift and support B LE into bed. Patient left with all needs met. RN notified of his progress. Cont per POC 5x/wk. Goal for tomorrow is get patient in a chair.  Tony Seaman, PT  10:25-10:50 8/19/18

## 2018-08-20 NOTE — SUBJECTIVE & OBJECTIVE
Review of Systems   Constitutional: Positive for fatigue. Negative for appetite change, chills, diaphoresis and fever.   HENT: Negative for congestion, nosebleeds, sore throat and trouble swallowing.    Eyes: Negative for pain, discharge and visual disturbance.   Respiratory: Negative for apnea, cough, chest tightness, shortness of breath, wheezing and stridor.    Cardiovascular: Positive for leg swelling (+3 BLE edema ). Negative for chest pain and palpitations.   Gastrointestinal: Negative for abdominal distention, abdominal pain, blood in stool, constipation, diarrhea, nausea and vomiting.   Endocrine: Negative for cold intolerance and heat intolerance.   Genitourinary: Negative for difficulty urinating, dysuria, flank pain, frequency and urgency.   Musculoskeletal: Positive for arthralgias (Bilateral knee pain ) and gait problem. Negative for back pain, joint swelling, myalgias, neck pain and neck stiffness.   Skin: Positive for wound. Negative for rash.   Allergic/Immunologic: Negative for food allergies and immunocompromised state.   Neurological: Positive for weakness. Negative for dizziness, seizures, syncope, facial asymmetry, light-headedness and headaches.   Hematological: Negative for adenopathy.   Psychiatric/Behavioral: Negative for agitation, behavioral problems and confusion. The patient is not nervous/anxious.      Objective:     Vital Signs (Most Recent):  Temp: 99.2 °F (37.3 °C) (08/20/18 0845)  Pulse: 95 (08/20/18 0845)  Resp: 16 (08/20/18 0845)  BP: 125/69 (08/20/18 0845)  SpO2: (!) 94 % (08/20/18 0845) Vital Signs (24h Range):  Temp:  [97 °F (36.1 °C)-99.7 °F (37.6 °C)] 99.2 °F (37.3 °C)  Pulse:  [] 95  Resp:  [16-22] 16  SpO2:  [94 %-97 %] 94 %  BP: (125-141)/(68-91) 125/69     Weight: 126.1 kg (278 lb)  Body mass index is 38.77 kg/m².    Intake/Output Summary (Last 24 hours) at 8/20/2018 1213  Last data filed at 8/20/2018 0500  Gross per 24 hour   Intake --   Output 925 ml   Net  -925 ml      Physical Exam   Constitutional: He is oriented to person, place, and time. He appears well-developed and well-nourished. No distress.   HENT:   Head: Normocephalic and atraumatic.   Nose: Nose normal.   Mouth/Throat: Oropharynx is clear and moist.   Eyes: Conjunctivae and EOM are normal. No scleral icterus.   Neck: Normal range of motion. Neck supple.   Cardiovascular: Normal rate, normal heart sounds and intact distal pulses. Exam reveals no gallop and no friction rub.   No murmur heard.  irregular   Pulmonary/Chest: Effort normal and breath sounds normal. No stridor. No respiratory distress. He has no wheezes. He has no rales. He exhibits no tenderness.   Abdominal: Soft. Bowel sounds are normal. He exhibits no distension. There is no tenderness. There is no rebound and no guarding.   Musculoskeletal: Normal range of motion. He exhibits edema (+2-3 BLE edema ). He exhibits no tenderness or deformity.   Neurological: He is alert and oriented to person, place, and time. He has normal reflexes. No cranial nerve deficit. He exhibits normal muscle tone. Coordination normal.   Skin: Skin is warm and dry. No rash noted. He is not diaphoretic. No erythema. No pallor.   Hypertrophy, thickened, and hyperpigmentation on B/L lower extremities. Chronic venous stasis changes BLE. One weeping wound lesion on Left calf. Foul odor. Limited ROM     Psychiatric: He has a normal mood and affect. His behavior is normal. Thought content normal.   Nursing note and vitals reviewed.      Significant Labs:   BMP:   Recent Labs   Lab  08/20/18 0421   GLU  94   NA  136   K  3.1*   CL  92*   CO2  35*   BUN  11   CREATININE  0.9   CALCIUM  8.8     CBC:   Recent Labs   Lab  08/20/18 0421   WBC  7.83   HGB  11.2*   HCT  34.3*   PLT  284     CMP:   Recent Labs   Lab  08/20/18 0421   NA  136   K  3.1*   CL  92*   CO2  35*   GLU  94   BUN  11   CREATININE  0.9   CALCIUM  8.8   ANIONGAP  9   EGFRNONAA  >60     All pertinent labs  within the past 24 hours have been reviewed.    Significant Imaging:   Imaging Results          X-Ray Chest AP Portable (Final result)  Result time 08/17/18 22:59:28    Final result by Vetnura Turcios MD (08/17/18 22:59:28)                 Impression:      No acute process seen.      Electronically signed by: Ventura Turcios MD  Date:    08/17/2018  Time:    22:59             Narrative:    EXAMINATION:  XR CHEST AP PORTABLE    CLINICAL HISTORY:  Chest Pain;    FINDINGS:  Single view of the chest.  Aorta demonstrates atherosclerotic disease.    Cardiac silhouette is normal.  The lungs demonstrate no evidence of active disease.  No evidence of pleural effusion or pneumothorax.  Bones demonstrate scattered degenerative changes.

## 2018-08-20 NOTE — PLAN OF CARE
Problem: Patient Care Overview  Goal: Plan of Care Review  Outcome: Ongoing (interventions implemented as appropriate)  Remained free from injury. Tolerating diet. No insulin required per sliding scale. Denies pain. Turning q2 with assistance. Afib on monitor, rate controlled. Continuing iv lasix. Wound care performed by wound care nurse this shift. 12 hour chart check complete.

## 2018-08-20 NOTE — PLAN OF CARE
Problem: Patient Care Overview  Goal: Plan of Care Review  Outcome: Ongoing (interventions implemented as appropriate)   08/20/18 5831   Coping/Psychosocial   Plan Of Care Reviewed With patient     Cardiac, vital signs, and lab monitoring; IV antibiotics. Daily PT; Increase activity as tolerated. Watch for falls. Elevate bilateral lower ext.; wound care and pain management as ordered.

## 2018-08-20 NOTE — ASSESSMENT & PLAN NOTE
Venous doppler U/S negative for DVT  Arterial U/S showed no significant stenosis   Start Gabapentin

## 2018-08-20 NOTE — PT/OT/SLP PROGRESS
Physical Therapy      Patient Name:  Jessee Katz   MRN:  [id number]    Patient not seen today secondary to  PATIENT RECEIVING BATH AT PRESENT TIME.. Will follow-up WHEN APPROPRIATE.  TIME:15:45pm  Elsy Benavidez PTA

## 2018-08-20 NOTE — PROGRESS NOTES
Pharmacy Brief Progress Note: Coumadin Education     Patient educated on warfarin indication, side effects, and drug interactions. Discussed importance of medication compliance and INR monitoring and reviewed signs of abnormal bleeding. Patient given warfarin educational handout. Patient expressed understanding and had no further questions.    Thank you for allowing us to participate in this patient's care.   Katherine McArdle, Pharm.D. 8/20/2018 6:18 PM

## 2018-08-20 NOTE — CONSULTS
CM spoke with patient at bedside and he prefers SNF in long term care setting. PASRR submitted to [facility] and LOCET called in. 142 received and loaded in [employer].     Call received from [facility] stating they will contact the patient regarding admission.

## 2018-08-20 NOTE — PROGRESS NOTES
[facility] Hospital Medicine  Progress Note    Patient Name: Jessee Katz  MRN: [id number]  Patient Class: OP- Observation   Admission Date: 8/17/2018  Length of Stay: 1 days  Attending Physician: Sulema Eason MD  Primary Care Provider: Олег Ribeiro MD        Subjective:     Principal Problem:Systolic CHF, acute on chronic    HPI:  The patient is a 78-year old man presenting with bilateral leg swelling and ulceration for about a month. He states he sees both his cardiologist as well as PCP and started having bilateral leg swellings about 8 months ago. He soaked it in bleach hoping it would go away. He had blood work done n the E indicating early rhabdomyolysis as well as sever hypokalemia.     Hospital Course:  The pt was placed in observation with decompensated CHF with BLE edema on IV lasix and infected Left LE venous stasis ulcer on oral Augmentin. Wound cx pending. Cardiac echo showed mildly depressed systolic function EF 50-55%. Pt counseled on CHF diet and fluid restriction. Pt reports diet and fluid restriction discretion.   Afib with RVR on admit- rate . Will add coreg. Cont digoxin   Pt also complain of burning pain to lower abdomen, buttock, and BLE . No midline TTP to cervical, thoracic or lumbar spine.   Will try low dose gabapentin  UA showed +leuk. Pt is asymptomatic. Urine culture pending.   Pt requests SNF         Review of Systems   Constitutional: Positive for fatigue. Negative for appetite change, chills, diaphoresis and fever.   HENT: Negative for congestion, nosebleeds, sore throat and trouble swallowing.    Eyes: Negative for pain, discharge and visual disturbance.   Respiratory: Negative for apnea, cough, chest tightness, shortness of breath, wheezing and stridor.    Cardiovascular: Positive for leg swelling (+3 BLE edema ). Negative for chest pain and palpitations.   Gastrointestinal: Negative for abdominal distention, abdominal pain, blood in stool,  constipation, diarrhea, nausea and vomiting.   Endocrine: Negative for cold intolerance and heat intolerance.   Genitourinary: Negative for difficulty urinating, dysuria, flank pain, frequency and urgency.   Musculoskeletal: Positive for arthralgias (Bilateral knee pain ) and gait problem. Negative for back pain, joint swelling, myalgias, neck pain and neck stiffness.   Skin: Positive for wound. Negative for rash.   Allergic/Immunologic: Negative for food allergies and immunocompromised state.   Neurological: Positive for weakness. Negative for dizziness, seizures, syncope, facial asymmetry, light-headedness and headaches.   Hematological: Negative for adenopathy.   Psychiatric/Behavioral: Negative for agitation, behavioral problems and confusion. The patient is not nervous/anxious.      Objective:     Vital Signs (Most Recent):  Temp: 99.2 °F (37.3 °C) (08/20/18 0845)  Pulse: 95 (08/20/18 0845)  Resp: 16 (08/20/18 0845)  BP: 125/69 (08/20/18 0845)  SpO2: (!) 94 % (08/20/18 0845) Vital Signs (24h Range):  Temp:  [97 °F (36.1 °C)-99.7 °F (37.6 °C)] 99.2 °F (37.3 °C)  Pulse:  [] 95  Resp:  [16-22] 16  SpO2:  [94 %-97 %] 94 %  BP: (125-141)/(68-91) 125/69     Weight: 126.1 kg (278 lb)  Body mass index is 38.77 kg/m².    Intake/Output Summary (Last 24 hours) at 8/20/2018 1213  Last data filed at 8/20/2018 0500  Gross per 24 hour   Intake --   Output 925 ml   Net -925 ml      Physical Exam   Constitutional: He is oriented to person, place, and time. He appears well-developed and well-nourished. No distress.   HENT:   Head: Normocephalic and atraumatic.   Nose: Nose normal.   Mouth/Throat: Oropharynx is clear and moist.   Eyes: Conjunctivae and EOM are normal. No scleral icterus.   Neck: Normal range of motion. Neck supple.   Cardiovascular: Normal rate, normal heart sounds and intact distal pulses. Exam reveals no gallop and no friction rub.   No murmur heard.  irregular   Pulmonary/Chest: Effort normal and breath  sounds normal. No stridor. No respiratory distress. He has no wheezes. He has no rales. He exhibits no tenderness.   Abdominal: Soft. Bowel sounds are normal. He exhibits no distension. There is no tenderness. There is no rebound and no guarding.   Musculoskeletal: Normal range of motion. He exhibits edema (+2-3 BLE edema ). He exhibits no tenderness or deformity.   Neurological: He is alert and oriented to person, place, and time. He has normal reflexes. No cranial nerve deficit. He exhibits normal muscle tone. Coordination normal.   Skin: Skin is warm and dry. No rash noted. He is not diaphoretic. No erythema. No pallor.   Hypertrophy, thickened, and hyperpigmentation on B/L lower extremities. Chronic venous stasis changes BLE. One weeping wound lesion on Left calf. Foul odor. Limited ROM     Psychiatric: He has a normal mood and affect. His behavior is normal. Thought content normal.   Nursing note and vitals reviewed.      Significant Labs:   BMP:   Recent Labs   Lab  08/20/18   0421   GLU  94   NA  136   K  3.1*   CL  92*   CO2  35*   BUN  11   CREATININE  0.9   CALCIUM  8.8     CBC:   Recent Labs   Lab  08/20/18 0421   WBC  7.83   HGB  11.2*   HCT  34.3*   PLT  284     CMP:   Recent Labs   Lab  08/20/18   0421   NA  136   K  3.1*   CL  92*   CO2  35*   GLU  94   BUN  11   CREATININE  0.9   CALCIUM  8.8   ANIONGAP  9   EGFRNONAA  >60     All pertinent labs within the past 24 hours have been reviewed.    Significant Imaging:   Imaging Results          X-Ray Chest AP Portable (Final result)  Result time 08/17/18 22:59:28    Final result by Ventura Turcios MD (08/17/18 22:59:28)                 Impression:      No acute process seen.      Electronically signed by: Ventura Turcios MD  Date:    08/17/2018  Time:    22:59             Narrative:    EXAMINATION:  XR CHEST AP PORTABLE    CLINICAL HISTORY:  Chest Pain;    FINDINGS:  Single view of the chest.  Aorta demonstrates atherosclerotic disease.    Cardiac  silhouette is normal.  The lungs demonstrate no evidence of active disease.  No evidence of pleural effusion or pneumothorax.  Bones demonstrate scattered degenerative changes.                              Assessment/Plan:      * Systolic CHF, acute on chronic    IV lasix  I/O  Daily weights  Obtain medical records from Dr. Angelito Ribeiro   Cont Lisinopril and digoxin   Add Coreg           Venous stasis ulcer of left calf limited to breakdown of skin without varicose veins    Oral Augmentin   Wound care consulted  Wound cx pending        Atrial fibrillation with RVR    Cont Digoxin  Cont Coumadin  Add Coreg           Pain in both lower extremities    Venous doppler U/S negative for DVT  Arterial U/S showed no significant stenosis   Start Gabapentin           UTI (urinary tract infection)    Asymptomatic  Urine culture pending           Weakness    PT/OT   SNF consult           Hypokalemia    Replete PRN          Non-traumatic rhabdomyolysis    improved          Venous stasis of both lower extremities    Venous doppler neg for DVT  Encourage ambulating with PT/OT            VTE Risk Mitigation (From admission, onward)        Ordered     warfarin (COUMADIN) tablet 5 mg  Every Friday 08/19/18 1148     warfarin (COUMADIN) tablet 5 mg  Every Wednesday 08/19/18 1146     warfarin tablet 7.5 mg  Every Monday 08/19/18 1149     warfarin (COUMADIN) tablet 5 mg  Every Tues, Thurs, Sat, Sun      08/19/18 1138     enoxaparin injection 40 mg  Daily      08/18/18 0347     IP VTE HIGH RISK PATIENT  Once      08/18/18 0347              Manasa Reyes NP  Department of Hospital Medicine   [facility]

## 2018-08-21 VITALS
BODY MASS INDEX: 36.08 KG/M2 | DIASTOLIC BLOOD PRESSURE: 81 MMHG | WEIGHT: 257.69 LBS | SYSTOLIC BLOOD PRESSURE: 141 MMHG | OXYGEN SATURATION: 95 % | HEIGHT: 71 IN | RESPIRATION RATE: 18 BRPM | HEART RATE: 92 BPM | TEMPERATURE: 97 F

## 2018-08-21 LAB
INR PPP: 1.3
POCT GLUCOSE: 111 MG/DL (ref 70–110)
PROTHROMBIN TIME: 13.1 SEC

## 2018-08-21 PROCEDURE — 90471 IMMUNIZATION ADMIN: CPT | Performed by: FAMILY MEDICINE

## 2018-08-21 PROCEDURE — 25000003 PHARM REV CODE 250: Performed by: FAMILY MEDICINE

## 2018-08-21 PROCEDURE — 25000003 PHARM REV CODE 250: Performed by: INTERNAL MEDICINE

## 2018-08-21 PROCEDURE — 63600175 PHARM REV CODE 636 W HCPCS: Performed by: NURSE PRACTITIONER

## 2018-08-21 PROCEDURE — 25000003 PHARM REV CODE 250: Performed by: NURSE PRACTITIONER

## 2018-08-21 PROCEDURE — 36415 COLL VENOUS BLD VENIPUNCTURE: CPT

## 2018-08-21 PROCEDURE — G0009 ADMIN PNEUMOCOCCAL VACCINE: HCPCS | Performed by: FAMILY MEDICINE

## 2018-08-21 PROCEDURE — 85610 PROTHROMBIN TIME: CPT

## 2018-08-21 PROCEDURE — 97530 THERAPEUTIC ACTIVITIES: CPT

## 2018-08-21 PROCEDURE — 90670 PCV13 VACCINE IM: CPT | Performed by: FAMILY MEDICINE

## 2018-08-21 PROCEDURE — 63600175 PHARM REV CODE 636 W HCPCS: Performed by: FAMILY MEDICINE

## 2018-08-21 RX ORDER — GABAPENTIN 100 MG/1
200 CAPSULE ORAL 3 TIMES DAILY
Qty: 180 CAPSULE | Refills: 11
Start: 2018-08-21 | End: 2019-08-21

## 2018-08-21 RX ORDER — POTASSIUM CHLORIDE 20 MEQ/1
20 TABLET, EXTENDED RELEASE ORAL DAILY
Start: 2018-08-22

## 2018-08-21 RX ORDER — POLYETHYLENE GLYCOL 3350 17 G/17G
17 POWDER, FOR SOLUTION ORAL DAILY
Refills: 0
Start: 2018-08-22

## 2018-08-21 RX ORDER — CARVEDILOL 3.12 MG/1
3.12 TABLET ORAL 2 TIMES DAILY
Qty: 60 TABLET | Refills: 11
Start: 2018-08-21 | End: 2019-08-21

## 2018-08-21 RX ORDER — AMOXICILLIN AND CLAVULANATE POTASSIUM 875; 125 MG/1; MG/1
1 TABLET, FILM COATED ORAL EVERY 12 HOURS
Qty: 10 TABLET | Refills: 0
Start: 2018-08-21 | End: 2018-08-21 | Stop reason: HOSPADM

## 2018-08-21 RX ORDER — POTASSIUM CHLORIDE 20 MEQ/1
20 TABLET, EXTENDED RELEASE ORAL ONCE
Status: COMPLETED | OUTPATIENT
Start: 2018-08-21 | End: 2018-08-21

## 2018-08-21 RX ORDER — LINEZOLID 600 MG/1
600 TABLET, FILM COATED ORAL EVERY 12 HOURS
Qty: 14 TABLET | Refills: 0
Start: 2018-08-21 | End: 2018-08-28

## 2018-08-21 RX ADMIN — PNEUMOCOCCAL 13-VALENT CONJUGATE VACCINE 0.5 ML: 2.2; 2.2; 2.2; 2.2; 2.2; 4.4; 2.2; 2.2; 2.2; 2.2; 2.2; 2.2; 2.2 INJECTION, SUSPENSION INTRAMUSCULAR at 02:08

## 2018-08-21 RX ADMIN — LISINOPRIL 5 MG: 5 TABLET ORAL at 08:08

## 2018-08-21 RX ADMIN — POTASSIUM CHLORIDE 20 MEQ: 1500 TABLET, EXTENDED RELEASE ORAL at 09:08

## 2018-08-21 RX ADMIN — POTASSIUM CHLORIDE 20 MEQ: 1500 TABLET, EXTENDED RELEASE ORAL at 02:08

## 2018-08-21 RX ADMIN — GABAPENTIN 200 MG: 100 CAPSULE ORAL at 02:08

## 2018-08-21 RX ADMIN — ATORVASTATIN CALCIUM 10 MG: 10 TABLET, FILM COATED ORAL at 08:08

## 2018-08-21 RX ADMIN — FUROSEMIDE 40 MG: 10 INJECTION, SOLUTION INTRAMUSCULAR; INTRAVENOUS at 08:08

## 2018-08-21 RX ADMIN — DIGOXIN 250 MCG: 125 TABLET ORAL at 08:08

## 2018-08-21 RX ADMIN — CARVEDILOL 3.12 MG: 3.12 TABLET, FILM COATED ORAL at 08:08

## 2018-08-21 RX ADMIN — GABAPENTIN 200 MG: 100 CAPSULE ORAL at 08:08

## 2018-08-21 RX ADMIN — AMOXICILLIN AND CLAVULANATE POTASSIUM 1 TABLET: 875; 125 TABLET, FILM COATED ORAL at 08:08

## 2018-08-21 RX ADMIN — BACITRACIN, NEOMYCIN, POLYMYXIN B: 400; 3.5; 5 OINTMENT TOPICAL at 08:08

## 2018-08-21 RX ADMIN — POLYETHYLENE GLYCOL 3350 17 G: 17 POWDER, FOR SOLUTION ORAL at 08:08

## 2018-08-21 NOTE — PT/OT/SLP PROGRESS
Physical Therapy Treatment    Patient Name:  Jessee Katz   MRN:  [id number]    Recommendations:     Discharge Recommendations:  nursing facility, skilled   Discharge Equipment Recommendations:     Barriers to discharge: Decreased caregiver support    Assessment:     Jessee Katz is a 78 y.o. male admitted with a medical diagnosis of Systolic CHF, acute on chronic.  He presents with the following impairments/functional limitations:  weakness, impaired functional mobilty, decreased safety awareness, impaired cardiopulmonary response to activity, pain, impaired skin, edema, impaired self care skills, decreased ROM, decreased lower extremity function, impaired balance .    Pt was agreeable to tx. Complaining of spasms or cramps in legs. Continues to be limited with mobility.     Rehab Prognosis:  fair; patient would benefit from acute skilled PT services to address these deficits and reach maximum level of function.      Recent Surgery: * No surgery found *      Plan:     During this hospitalization, patient to be seen 5 x/week to address the above listed problems via gait training, therapeutic activities, therapeutic exercises  · Plan of Care Expires:      Plan of Care Reviewed with: patient    Subjective     Communicated with epic and nurse prior to session.  Patient found sitting in bed upon PT entry to room, agreeable to treatment.      Chief Complaint: spasms in legs  Patient comments/goals: none stated  Pain/Comfort:  · Pain Rating 1: 6/10(spasms. not constant)  · Location - Side 1: Bilateral  · Location - Orientation 1: upper  · Location 1: leg  · Pain Addressed 2: Nurse notified    Patients cultural, spiritual, Buddhist conflicts given the current situation:      Objective:     Patient found with: peripheral IV, telemetry     General Precautions: Standard, fall   Orthopedic Precautions:N/A   Braces:       Functional Mobility:  · Bed Mobility:     · Supine to Sit: moderate assistance  · Transfers:     · Bed to  Chair: moderate assistance with  no AD  using  Squat Pivot      AM-PAC 6 CLICK MOBILITY  Turning over in bed (including adjusting bedclothes, sheets and blankets)?: 3  Sitting down on and standing up from a chair with arms (e.g., wheelchair, bedside commode, etc.): 3  Moving from lying on back to sitting on the side of the bed?: 3  Moving to and from a bed to a chair (including a wheelchair)?: 1  Need to walk in hospital room?: 1  Climbing 3-5 steps with a railing?: 1  Basic Mobility Total Score: 12       Therapeutic Activities and Exercises:   LE therex for ROM : ap , heel slides and hip ab adduction    Patient left up in wheelchair for transport to SNF with nurse present..    GOALS:   Multidisciplinary Problems     Physical Therapy Goals     Not on file          Multidisciplinary Problems (Resolved)        Problem: Physical Therapy Goal    Goal Priority Disciplines Outcome Goal Variances Interventions   Physical Therapy Goal   (Resolved)     PT, PT/OT Outcome(s) achieved     Description:  STG's for patient to reach by 8/27/18  1. Patient will perform sup to sit sup with bed rail and min A for sit to supine  2. Patient will perform sit to stand from elevated bed with RW sba  3. Patient will amb x 20ft RW min A - follow with w/c  4. Patient will perform B LE exs x 20 reps - seated and supine with assist prn(within pain tolerance)                    Time Tracking:     PT Received On: 08/21/18  PT Start Time: 1555     PT Stop Time: 1610  PT Total Time (min): 15 min     Billable Minutes: Therapeutic Activity 15       PT/PTA: PTA     PTA Visit Number: 1     Catrina Duff, LINDSAY  08/21/2018

## 2018-08-21 NOTE — DISCHARGE INSTRUCTIONS
BLE VLU:  1. Cleanse with saline  2. Pat dry  3. Paint ro wound with cavilon  4. Apply aquacel foam dressing  5. Change every 3 days     Dry flaky skin BLE:  1. Cleanse with bath wipes every day  2. Apply sween 24 cream daily  3. Elevate BLE at all times

## 2018-08-21 NOTE — PLAN OF CARE
Discharge orders noted, Faxed all appropriate discharge paperwork to [facility].  Contacted Karie Admissions at [facility]. Patient will be going to [address]. # for report is [phone]. [employer] [facility] confirmed with June at [facility] for 245 this afternoon. Update to Salma primary nurse.

## 2018-08-21 NOTE — DISCHARGE SUMMARY
[facility] Hospital Medicine  Discharge Summary      Patient Name: Jessee Katz  MRN: [id number]  Admission Date: 8/17/2018  Hospital Length of Stay: 2 days  Discharge Date and Time:  08/21/2018 2:04 PM  Attending Physician: Sulema Easno MD   Discharging Provider: Manasa Reyes NP  Primary Care Provider: Олег Ribeiro MD      HPI:   The patient is a 78-year old man with Afib, CHF, HTN who presented with bilateral leg swelling and ulceration for about a month. He states he sees both his cardiologist as well as PCP and started having bilateral leg swellings about 8 months ago. He soaked it in bleach hoping it would go away. He had blood work done n the ED indicating early rhabdomyolysis as well as sever hypokalemia.     * No surgery found *      Hospital Course:   The pt was placed in observation with decompensated CHF with BLE edema on IV lasix and infected Left LE venous stasis ulcer on oral Augmentin. Wound nurse performed wound care to venous stasis ulcer. Wound cx showed enterococcus. Will discharge pt on oral Zyvox. Wound culture sensitivities pending. Cardiac echo showed mildly depressed systolic function EF 50-55%. Pt reports diet and fluid restriction indiscretion. Pt counseled on CHF diet and fluid restriction. Pt will be discharged back on home medication Lasix 40mg po BID.   Pt presented with hypokalemia. Potassium repleted- pt will be discharged on KCL 20meq daily.   Afib with RVR on admit- rate . Coreg added and pt cont on digoxin. HR improved.   Pt also complain of burning pain to lower abdomen, buttock, and BLE . No midline TTP to cervical, thoracic or lumbar spine. Pt placed on gabapentin and pain improved.   UA showed +1 leuk. Pt is asymptomatic. Urine culture pending.   Pt requests SNF - Pt will be discharged to [facility] for PT/OT 5 times/week.      Consults:   Consults (From admission, onward)        Status Ordering Provider     Inpatient consult to Social  Work  Once     Provider:  (Not yet assigned)    Completed JUSTINE SANZ     Pharmacy to dose Warfarin consult  Once     Provider:  (Not yet assigned)    Acknowledged YINA TIM            Final Active Diagnoses:    Diagnosis Date Noted POA    PRINCIPAL PROBLEM:  Systolic CHF, acute on chronic [I50.23] 08/20/2018 Yes    Venous stasis ulcer of left calf limited to breakdown of skin without varicose veins [I87.2, L97.221] 08/18/2018 Yes    Atrial fibrillation with RVR [I48.91] 08/20/2018 Yes    UTI (urinary tract infection) [N39.0] 08/20/2018 Yes    Pain in both lower extremities [M79.604, M79.605] 08/20/2018 Yes    Weakness [R53.1] 08/19/2018 Yes    Venous stasis of both lower extremities [I87.8] 08/18/2018 Yes    Non-traumatic rhabdomyolysis [M62.82] 08/18/2018 Yes    Hypokalemia [E87.6] 08/18/2018 Yes      Problems Resolved During this Admission:       Discharged Condition: stable    Disposition: Skilled Nursing Facility    Follow Up:  PCP in 3 days  Cardiology in 2 weeks     Patient Instructions:      Diet Cardiac   Scheduling Instructions: 2 gm NA, 1500 ml fluid restriction     Activity as tolerated       Significant Diagnostic Studies:   Imaging Results          X-Ray Chest AP Portable (Final result)  Result time 08/17/18 22:59:28    Final result by Ventura Turcios MD (08/17/18 22:59:28)                 Impression:      No acute process seen.      Electronically signed by: Ventura Turcios MD  Date:    08/17/2018  Time:    22:59             Narrative:    EXAMINATION:  XR CHEST AP PORTABLE    CLINICAL HISTORY:  Chest Pain;    FINDINGS:  Single view of the chest.  Aorta demonstrates atherosclerotic disease.    Cardiac silhouette is normal.  The lungs demonstrate no evidence of active disease.  No evidence of pleural effusion or pneumothorax.  Bones demonstrate scattered degenerative changes.                                Pending Diagnostic Studies:     None         Medications:  Reconciled  Home Medications:      Medication List      START taking these medications    carvedilol 3.125 MG tablet  Commonly known as:  COREG  Take 1 tablet (3.125 mg total) by mouth 2 (two) times daily.     gabapentin 100 MG capsule  Commonly known as:  NEURONTIN  Take 2 capsules (200 mg total) by mouth 3 (three) times daily.     linezolid 600 mg Tab  Commonly known as:  ZYVOX  Take 1 tablet (600 mg total) by mouth every 12 (twelve) hours. for 7 days     neomycin-bacitracnZn-polymyxnB 3.5-400-5,000 mg-unit-unit Oipk  Apply topically 2 (two) times daily.     polyethylene glycol 17 gram Pwpk  Commonly known as:  GLYCOLAX  Take 17 g by mouth once daily.     potassium chloride SA 20 MEQ tablet  Commonly known as:  K-DUR,KLOR-CON  Take 1 tablet (20 mEq total) by mouth once daily.        CONTINUE taking these medications    atorvastatin 10 MG tablet  Commonly known as:  LIPITOR  Take 10 mg by mouth once daily.     digoxin 250 mcg tablet  Commonly known as:  LANOXIN  Take 250 mcg by mouth once daily.     furosemide 40 MG tablet  Commonly known as:  LASIX  Take 40 mg by mouth 2 (two) times daily.     lisinopril 5 MG tablet  Commonly known as:  PRINIVIL,ZESTRIL  Take 5 mg by mouth once daily.     traMADol 50 mg tablet  Commonly known as:  ULTRAM  Take 50 mg by mouth every 8 (eight) hours as needed for Pain.     warfarin 5 MG tablet  Commonly known as:  COUMADIN  Take 5 mg by mouth Daily.        STOP taking these medications    sulfamethoxazole-trimethoprim 400-80mg 400-80 mg per tablet  Commonly known as:  BACTRIM,SEPTRA            Indwelling Lines/Drains at time of discharge:   Lines/Drains/Airways          None          Time spent on the discharge of patient: 42 minutes  Patient was seen and examined on the date of discharge and determined to be suitable for discharge.         Manasa Reyes NP  Department of Hospital Medicine  [facility]

## 2018-08-21 NOTE — PROGRESS NOTES
Clinical Pharmacy Progress Note: Coumadin Dosing and Monitoring     Goal INR: 2-3   Indication: atrial fibrillation w/ RVR   Lab Results   Component Value Date    INR 1.3 (H) 08/21/2018    INR 1.5 (H) 08/20/2018    INR 1.9 (H) 08/19/2018   Patient has been educated by pharmacist this admission.     Current dose: 5 mg every day except for Monday, 7.5 mg on Monday     Plan: Continue current dosing. Monitor closely for INR increase with Augmentin coadministration. Afib on the monitor yesterday. Will recommend adding Lovenox 1 mg/Kg (120 mg) Q12H bridge until therapeutic.   PT/INR will be monitored daily. Dose adjustments will be made accordingly.      Thank you for allowing us to participate in this patient's care.    Mine Yu, PharmD 8/21/2018 7:09 AM

## 2018-08-21 NOTE — PLAN OF CARE
08/21/18 1210   Medicare Message   Important Message from Medicare regarding Discharge Appeal Rights Given to patient/caregiver;Signed/date by patient/caregiver;Explained to patient/caregiver   Date IMM was signed 08/21/18   Time IMM was signed 1210

## 2018-08-21 NOTE — PLAN OF CARE
Assumed care of this patient. Per [facility] declined patient for admission. Sent additional notes to [facility] as requested.  Message sent to Karie Larsen Admissions at [facility].

## 2018-08-21 NOTE — PT/OT/SLP EVAL
Physical Therapy Evaluation    Patient Name:  Jessee Katz   MRN:  [id number]    Recommendations:     Discharge Recommendations:  nursing facility, skilled, LTACH (long term acute care hospital)   Discharge Equipment Recommendations: bath bench   Barriers to discharge: Decreased caregiver support    Assessment:     Jessee Katz is a 78 y.o. male admitted with a medical diagnosis of Systolic CHF, acute on chronic.  He presents with the following impairments/functional limitations:  weakness, impaired functional mobilty, decreased safety awareness, impaired coordination, impaired endurance, gait instability, pain, edema, decreased lower extremity function, impaired self care skills, impaired balance.    Rehab Prognosis:  good; patient would benefit from acute skilled PT services to address these deficits and reach maximum level of function.      Recent Surgery: * No surgery found *      Plan:     During this hospitalization, patient to be seen 5 x/week to address the above listed problems via gait training, therapeutic activities, therapeutic exercises  · Plan of Care Expires:      Plan of Care Reviewed with: patient    Subjective     Communicated with Salma prior to session.  Patient found lying supine with HOB and feet elevated upon PT entry to room, agreeable to evaluation.      Chief Complaint: pain and weakness  Patient comments/goals: walk and go home  Pain/Comfort:  ·      Patients cultural, spiritual, Rastafarian conflicts given the current situation:      Living Environment:  Lives alone  Prior to admission, patients level of function was mod indep with dme.  Patient has the following equipment: walker, rolling, bedside commode and hospital bed.  DME owned (not currently used): none.  Upon discharge, patient will have minimal assistance from family.    Objective:     Patient found with: bed alarm, peripheral IV, telemetry     General Precautions: Standard, fall   Orthopedic Precautions:N/A   Braces:        Exams:  · B LE ROM - min decrease with pain L ankle DF - all else wfl; strength 3/5 except L ankle 3-/5 and pain    Functional Mobility:  · Bed Mobility - Min A Sup to Sit - pulled up with PT hand assist; max A sit to supine to support and lift legs into bed  · Transfer - Min A Sit to Stand from elevated bed with RW  · Gt - Side steps with RW Min A - dec speed    AM-PAC 6 CLICK MOBILITY  Total Score:        Therapeutic Activities and Exercises:   PT educated patient on POC, fall precautions, pain management and safety with mobility training using DME.    Patient left HOB elevated with all lines intact, call button in reach, bed alarm on and nurse notified.    GOALS:   Multidisciplinary Problems     Physical Therapy Goals     Not on file          Multidisciplinary Problems (Resolved)        Problem: Physical Therapy Goal    Goal Priority Disciplines Outcome Goal Variances Interventions   Physical Therapy Goal   (Resolved)     PT, PT/OT Outcome(s) achieved     Description:  STG's for patient to reach by 8/27/18  1. Patient will perform sup to sit sup with bed rail and min A for sit to supine  2. Patient will perform sit to stand from elevated bed with RW sba  3. Patient will amb x 20ft RW min A - follow with w/c  4. Patient will perform B LE exs x 20 reps - seated and supine with assist prn(within pain tolerance)                    History:     Past Medical History:   Diagnosis Date    A-fib     CHF (congestive heart failure)     Hypertension        Past Surgical History:   Procedure Laterality Date    NO PAST SURGERIES         Clinical Decision Making:     History  Co-morbidities and personal factors that may impact the plan of care Examination  Body Structures and Functions, activity limitations and participation restrictions that may impact the plan of care Clinical Presentation   Decision Making/ Complexity Score   Co-morbidities:   [] Time since onset of injury / illness / exacerbation  [] Status of  current condition  []Patient's cognitive status and safety concerns    [] Multiple Medical Problems (see med hx)  Personal Factors:   [] Patient's age  [] Prior Level of function   [] Patient's home situation (environment and family support)  [] Patient's level of motivation  [] Expected progression of patient      HISTORY:(criteria)    [] 99268 - no personal factors/history    [] 05463 - has 1-2 personal factor/comorbidity     [] 08446 - has >3 personal factor/comorbidity     Body Regions:  [] Objective examination findings  [] Head     []  Neck  [] Trunk   [] Upper Extremity  [] Lower Extremity    Body Systems:  [] For communication ability, affect, cognition, language, and learning style: the assessment of the ability to make needs known, consciousness, orientation (person, place, and time), expected emotional /behavioral responses, and learning preferences (eg, learning barriers, education  needs)  [] For the neuromuscular system: a general assessment of gross coordinated movement (eg, balance, gait, locomotion, transfers, and transitions) and motor function  (motor control and motor learning)  [] For the musculoskeletal system: the assessment of gross symmetry, gross range of motion, gross strength, height, and weight  [] For the integumentary system: the assessment of pliability(texture), presence of scar formation, skin color, and skin integrity  [] For cardiovascular/pulmonary system: the assessment of heart rate, respiratory rate, blood pressure, and edema     Activity limitations:    [] Patient's cognitive status and saf ety concerns          [] Status of current condition      [] Weight bearing restriction  [] Cardiopulmunary Restriction    Participation Restrictions:   [] Goals and goal agreement with the patient     [] Rehab potential (prognosis) and probable outcome      Examination of Body System: (criteria)    [] 02710 - addressing 1-2 elements    [] 19396 - addressing a total of 3 or more elements      [] 48408 -  Addressing a total of 4 or more elements         Clinical Presentation: (criteria)  Choose one     On examination of body system using standardized tests and measures patient presents with (CHOOSE ONE) elements from any of the following: body structures and functions, activity limitations, and/or participation restrictions.  Leading to a clinical presentation that is considered (CHOOSE ONE)                              Clinical Decision Making  (Eval Complexity):  Choose One     Time Tracking:     PT Received On: 08/18/18  PT Start Time: 1020     PT Stop Time: 1100  PT Total Time (min): 40 min     Billable Minutes: Evaluation 15, Therapeutic Activity 15 and Therapeutic Exercise 10      Tony Seaman, PT  08/21/2018

## 2018-08-21 NOTE — PLAN OF CARE
Problem: Patient Care Overview  Goal: Plan of Care Review  Outcome: Ongoing (interventions implemented as appropriate)   08/21/18 0140   Coping/Psychosocial   Plan Of Care Reviewed With patient     Cardiac, vital signs, and lab monitoring. Turn every 2 hours. Increase activity as tolerated. Watch for falls. Watch for signs and symptoms of bleeding.  Accuchecks per order.Daily weights. Wound care per order. PT/OT as ordered.

## 2018-08-22 LAB
BACTERIA SPEC AEROBE CULT: NORMAL
BACTERIA UR CULT: NO GROWTH

## 2018-08-22 NOTE — PLAN OF CARE
08/22/18 1553   Final Note   Assessment Type Final Discharge Note   Discharge Disposition SNF   Right Care Referral Info   Post Acute Recommendation SNF / Sub-Acute Rehab   Facility Name [facility]

## 2022-04-04 NOTE — NURSING
Pt discharged per MD order. Pt instructions and handout given to pt. Pt instructed to schedule and keep all f/u appointments. Pt verbalizes understanding. IV removed, tele box returned to monitor tech. Pt taken in a wheelchair with all pt belongings by [employer] to be transferred to [facility].   2 = A lot of assistance